# Patient Record
Sex: FEMALE | NOT HISPANIC OR LATINO | Employment: FULL TIME
[De-identification: names, ages, dates, MRNs, and addresses within clinical notes are randomized per-mention and may not be internally consistent; named-entity substitution may affect disease eponyms.]

---

## 2017-10-31 ENCOUNTER — IMAGING SERVICES (OUTPATIENT)
Dept: OTHER | Age: 27
End: 2017-10-31

## 2017-10-31 ENCOUNTER — LAB SERVICES (OUTPATIENT)
Dept: OTHER | Age: 27
End: 2017-10-31

## 2017-10-31 ENCOUNTER — HOSPITAL (OUTPATIENT)
Dept: OTHER | Age: 27
End: 2017-10-31
Attending: EMERGENCY MEDICINE

## 2017-10-31 LAB
ABO + RH BLD: NORMAL
ANALYZER ANC (IANC): ABNORMAL
ANALYZER ANC (IANC): ABNORMAL
APPEARANCE UR: CLEAR
APPEARANCE UR: CLEAR
BACTERIA #/AREA URNS HPF: ABNORMAL /HPF
BACTERIA #/AREA URNS HPF: ABNORMAL /HPF
BASOPHILS # BLD: 0 K/MCL (ref 0–0.3)
BASOPHILS # BLD: 0 THOUSAND/MCL (ref 0–0.3)
BASOPHILS NFR BLD: 0 %
BASOPHILS NFR BLD: 0 %
BILIRUB UR QL: NEGATIVE
BILIRUB UR QL: NEGATIVE
C TRACH RRNA SPEC QL NAA+PROBE: NEGATIVE
CLUE CELLS SPEC QL WET PREP: NORMAL
CLUE CELLS SPEC QL WET PREP: NORMAL
COLOR UR: YELLOW
COLOR UR: YELLOW
DIFFERENTIAL METHOD BLD: ABNORMAL
DIFFERENTIAL METHOD BLD: ABNORMAL
EOSINOPHIL # BLD: 0.6 K/MCL (ref 0.1–0.5)
EOSINOPHIL # BLD: 0.6 THOUSAND/MCL (ref 0.1–0.5)
EOSINOPHIL NFR BLD: 5 %
EOSINOPHIL NFR BLD: 5 %
ERYTHROCYTE [DISTWIDTH] IN BLOOD: 12.3 % (ref 11–15)
ERYTHROCYTE [DISTWIDTH] IN BLOOD: 12.3 % (ref 11–15)
GLUCOSE UR-MCNC: NEGATIVE MG/DL
GLUCOSE UR-MCNC: NEGATIVE MG/DL
HCG POINT OF CARE (5HGRST): POSITIVE
HCG POINT OF CARE (5HGRST): POSITIVE
HCG SERPL-ACNC: ABNORMAL MUNIT/ML
HCG SERPL-ACNC: ABNORMAL MUNITS/ML
HEMATOCRIT: 40.1 % (ref 36–46.5)
HEMATOCRIT: 40.1 % (ref 36–46.5)
HEMOGLOBIN: 13.8 G/DL (ref 12–15.5)
HGB BLD-MCNC: 13.8 GM/DL (ref 12–15.5)
HGB UR QL: ABNORMAL
HYALINE CASTS #/AREA URNS LPF: ABNORMAL /LPF (ref 0–5)
HYALINE CASTS #/AREA URNS LPF: ABNORMAL /LPF (ref 0–5)
KETONES UR-MCNC: NEGATIVE MG/DL
KETONES UR-MCNC: NEGATIVE MG/DL
LEUKOCYTE ESTERASE UR QL STRIP: NEGATIVE
LYMPHOCYTES # BLD: 1.8 K/MCL (ref 1–4.8)
LYMPHOCYTES # BLD: 1.8 THOUSAND/MCL (ref 1–4.8)
LYMPHOCYTES NFR BLD: 15 %
LYMPHOCYTES NFR BLD: 15 %
MCH RBC QN AUTO: 30.7 PG (ref 26–34)
MCHC RBC AUTO-ENTMCNC: 34.4 GM/DL (ref 32–36.5)
MCV RBC AUTO: 89.3 FL (ref 78–100)
MEAN CORPUSCULAR HEMOGLOBIN: 30.7 PG (ref 26–34)
MEAN CORPUSCULAR HGB CONC: 34.4 G/DL (ref 32–36.5)
MEAN CORPUSCULAR VOLUME: 89.3 FL (ref 78–100)
MICROSCOPIC (MT): ABNORMAL
MICROSCOPIC (MT): ABNORMAL
MONOCYTES # BLD: 1.1 K/MCL (ref 0.3–0.9)
MONOCYTES # BLD: 1.1 THOUSAND/MCL (ref 0.3–0.9)
MONOCYTES NFR BLD: 9 %
MONOCYTES NFR BLD: 9 %
N GONORRHOEA RRNA SPEC QL NAA+PROBE: NEGATIVE
NEUTROPHILS # BLD: 8.6 K/MCL (ref 1.8–7.7)
NEUTROPHILS # BLD: 8.6 THOUSAND/MCL (ref 1.8–7.7)
NEUTROPHILS NFR BLD: 71 %
NEUTROPHILS NFR BLD: 71 %
NEUTS SEG NFR BLD: ABNORMAL
NEUTS SEG NFR BLD: ABNORMAL %
NITRITE UR QL: NEGATIVE
NITRITE UR QL: NEGATIVE
NRBC (NRBCRE): ABNORMAL
PERCENT NRBC: ABNORMAL
PH UR: 6 UNIT (ref 5–7)
PH UR: 6 UNITS (ref 5–7)
PLATELET # BLD: 330 THOUSAND/MCL (ref 140–450)
PLATELET COUNT: 330 K/MCL (ref 140–450)
PROT UR QL: NEGATIVE MG/DL
PROT UR QL: NEGATIVE MG/DL
RBC # BLD: 4.49 MILLION/MCL (ref 4–5.2)
RBC #/AREA URNS HPF: ABNORMAL /HPF (ref 0–3)
RBC #/AREA URNS HPF: ABNORMAL /HPF (ref 0–3)
RBC-URINE: ABNORMAL
RED CELL COUNT: 4.49 MIL/MCL (ref 4–5.2)
SP GR UR: 1.01 (ref 1–1.03)
SP GR UR: 1.01 (ref 1–1.03)
SPECIMEN SOURCE: ABNORMAL
SPECIMEN SOURCE: ABNORMAL
SPECIMEN SOURCE: NORMAL
SQUAMOUS #/AREA URNS HPF: ABNORMAL /HPF (ref 0–5)
SQUAMOUS #/AREA URNS HPF: ABNORMAL /HPF (ref 0–5)
T VAGINALIS SPEC QL WET PREP: NORMAL
T VAGINALIS SPEC QL WET PREP: NORMAL
URNS CMNT MICRO: ABNORMAL
URNS CMNT MICRO: ABNORMAL
UROBILINOGEN UR QL: 0.2 MG/DL (ref 0–1)
UROBILINOGEN UR QL: 0.2 MG/DL (ref 0–1)
WBC # BLD: 12.1 THOUSAND/MCL (ref 4.2–11)
WBC #/AREA URNS HPF: ABNORMAL /HPF (ref 0–5)
WBC #/AREA URNS HPF: ABNORMAL /HPF (ref 0–5)
WBC-URINE: NEGATIVE
WHITE BLOOD COUNT: 12.1 K/MCL (ref 4.2–11)
YEAST SPEC QL WET PREP: NORMAL
YEAST SPEC QL WET PREP: NORMAL

## 2018-02-08 ENCOUNTER — HOSPITAL (OUTPATIENT)
Dept: OTHER | Age: 28
End: 2018-02-08

## 2021-01-06 LAB
1HR O'SULLIVAN: 270
ABO + RH BLD: NORMAL
C TRACH RRNA SPEC QL NAA+PROBE: NEGATIVE
HIV 1+2 AB+HIV1 P24 AG SERPL QL IA: NORMAL
N GONORRHOEA RRNA SPEC QL NAA+PROBE: NEGATIVE
RPR SER QL: NORMAL
RUBV IGG SERPL IA-ACNC: NORMAL

## 2021-01-27 ENCOUNTER — TELEPHONE (OUTPATIENT)
Dept: MATERNAL FETAL MEDICINE | Age: 31
End: 2021-01-27

## 2021-01-27 DIAGNOSIS — O24.419 GDM (GESTATIONAL DIABETES MELLITUS): Primary | ICD-10-CM

## 2021-01-28 PROBLEM — O99.210 OBESITY COMPLICATING PREGNANCY: Status: ACTIVE | Noted: 2021-01-28

## 2021-01-28 PROBLEM — O24.419 GESTATIONAL DIABETES MELLITUS: Status: ACTIVE | Noted: 2021-01-28

## 2021-01-28 PROBLEM — Z3A.12 12 WEEKS GESTATION OF PREGNANCY: Status: ACTIVE | Noted: 2021-01-28

## 2021-02-02 ENCOUNTER — OFFICE VISIT (OUTPATIENT)
Dept: MATERNAL FETAL MEDICINE | Age: 31
End: 2021-02-02

## 2021-02-02 DIAGNOSIS — O99.211 OBESITY AFFECTING PREGNANCY IN FIRST TRIMESTER: Primary | ICD-10-CM

## 2021-02-02 DIAGNOSIS — Z3A.12 12 WEEKS GESTATION OF PREGNANCY: ICD-10-CM

## 2021-02-02 DIAGNOSIS — O24.419 GESTATIONAL DIABETES MELLITUS (GDM) IN FIRST TRIMESTER, GESTATIONAL DIABETES METHOD OF CONTROL UNSPECIFIED: ICD-10-CM

## 2021-02-02 DIAGNOSIS — Z87.59 HISTORY OF PRIOR PREGNANCY WITH IUGR NEWBORN: ICD-10-CM

## 2021-02-02 PROCEDURE — 99243 OFF/OP CNSLTJ NEW/EST LOW 30: CPT | Performed by: CLINICAL NURSE SPECIALIST

## 2021-02-02 RX ORDER — LANCETS
EACH MISCELLANEOUS
Qty: 102 EACH | Refills: 5 | Status: SHIPPED | OUTPATIENT
Start: 2021-02-02 | End: 2021-06-03 | Stop reason: SDUPTHER

## 2021-02-02 RX ORDER — VITAMIN A ACETATE, BETA CAROTENE, ASCORBIC ACID, CHOLECALCIFEROL, .ALPHA.-TOCOPHEROL ACETATE, DL-, THIAMINE MONONITRATE, RIBOFLAVIN, NIACINAMIDE, PYRIDOXINE HYDROCHLORIDE, FOLIC ACID, CYANOCOBALAMIN, CALCIUM CARBONATE, FERROUS FUMARATE, ZINC OXIDE, CUPRIC OXIDE 3080; 12; 120; 400; 1; 1.84; 3; 20; 22; 920; 25; 200; 27; 10; 2 [IU]/1; UG/1; MG/1; [IU]/1; MG/1; MG/1; MG/1; MG/1; MG/1; [IU]/1; MG/1; MG/1; MG/1; MG/1; MG/1
1 TABLET, FILM COATED ORAL DAILY
COMMUNITY

## 2021-02-02 RX ORDER — ASPIRIN 81 MG/1
81 TABLET ORAL DAILY
Qty: 30 TABLET | Refills: 5 | Status: ON HOLD | COMMUNITY
Start: 2021-02-02 | End: 2021-08-02 | Stop reason: HOSPADM

## 2021-02-02 ASSESSMENT — ENCOUNTER SYMPTOMS
LIGHT-HEADEDNESS: 0
CONFUSION: 0
DIZZINESS: 0
TROUBLE SWALLOWING: 0
COUGH: 0
DIARRHEA: 0
NAUSEA: 1
VOMITING: 1
PHOTOPHOBIA: 0
FEVER: 0
ABDOMINAL PAIN: 0
CONSTIPATION: 0
UNEXPECTED WEIGHT CHANGE: 0
FATIGUE: 0
CHILLS: 0
POLYDIPSIA: 0
CHEST TIGHTNESS: 0
SORE THROAT: 0
APPETITE CHANGE: 0
SHORTNESS OF BREATH: 0
POLYPHAGIA: 0
HEADACHES: 0
NERVOUS/ANXIOUS: 0
CHOKING: 0

## 2021-02-03 LAB — HBA1C MFR BLD: 6.7 %

## 2021-02-08 PROBLEM — Z3A.13 13 WEEKS GESTATION OF PREGNANCY: Status: ACTIVE | Noted: 2021-01-28

## 2021-02-10 ENCOUNTER — TELEPHONIC VISIT (OUTPATIENT)
Dept: MATERNAL FETAL MEDICINE | Age: 31
End: 2021-02-10

## 2021-02-10 DIAGNOSIS — Z36.9 ENCOUNTER FOR ANTENATAL SCREENING OF MOTHER: Primary | ICD-10-CM

## 2021-02-10 PROCEDURE — 99203 OFFICE O/P NEW LOW 30 MIN: CPT | Performed by: OBSTETRICS & GYNECOLOGY

## 2021-02-11 ENCOUNTER — TELEPHONE (OUTPATIENT)
Dept: NUTRITION | Age: 31
End: 2021-02-11

## 2021-02-12 ENCOUNTER — V-VISIT (OUTPATIENT)
Dept: NUTRITION | Age: 31
End: 2021-02-12

## 2021-02-12 DIAGNOSIS — O24.419 GESTATIONAL DIABETES MELLITUS (GDM) IN FIRST TRIMESTER, GESTATIONAL DIABETES METHOD OF CONTROL UNSPECIFIED: Primary | ICD-10-CM

## 2021-02-12 PROCEDURE — 97802 MEDICAL NUTRITION INDIV IN: CPT | Performed by: DIETITIAN, REGISTERED

## 2021-03-08 ENCOUNTER — DOCUMENTATION (OUTPATIENT)
Dept: MATERNAL FETAL MEDICINE | Age: 31
End: 2021-03-08

## 2021-03-16 ENCOUNTER — TELEPHONE (OUTPATIENT)
Dept: MATERNAL FETAL MEDICINE | Age: 31
End: 2021-03-16

## 2021-03-16 DIAGNOSIS — Z34.90 PREGNANCY: Primary | ICD-10-CM

## 2021-03-16 DIAGNOSIS — O24.419 GDM (GESTATIONAL DIABETES MELLITUS): ICD-10-CM

## 2021-03-18 DIAGNOSIS — O24.419 GESTATIONAL DIABETES MELLITUS (GDM) IN FIRST TRIMESTER, GESTATIONAL DIABETES METHOD OF CONTROL UNSPECIFIED: ICD-10-CM

## 2021-04-14 ENCOUNTER — OFFICE VISIT (OUTPATIENT)
Dept: MATERNAL FETAL MEDICINE | Age: 31
End: 2021-04-14

## 2021-04-14 DIAGNOSIS — O99.211 OBESITY AFFECTING PREGNANCY IN FIRST TRIMESTER: ICD-10-CM

## 2021-04-14 DIAGNOSIS — Z87.59 HISTORY OF PRIOR PREGNANCY WITH IUGR NEWBORN: ICD-10-CM

## 2021-04-14 DIAGNOSIS — Z36.89 ENCOUNTER FOR FETAL ANATOMIC SURVEY: Primary | ICD-10-CM

## 2021-04-14 DIAGNOSIS — O24.419 GESTATIONAL DIABETES MELLITUS (GDM) IN SECOND TRIMESTER, GESTATIONAL DIABETES METHOD OF CONTROL UNSPECIFIED: ICD-10-CM

## 2021-04-14 PROCEDURE — 76817 TRANSVAGINAL US OBSTETRIC: CPT | Performed by: OBSTETRICS & GYNECOLOGY

## 2021-04-14 PROCEDURE — 76811 OB US DETAILED SNGL FETUS: CPT | Performed by: OBSTETRICS & GYNECOLOGY

## 2021-05-13 DIAGNOSIS — Z36.89 ENCOUNTER FOR ULTRASOUND TO ASSESS FETAL GROWTH: Primary | ICD-10-CM

## 2021-05-14 ENCOUNTER — OFFICE VISIT (OUTPATIENT)
Dept: MATERNAL FETAL MEDICINE | Age: 31
End: 2021-05-14

## 2021-05-14 DIAGNOSIS — O99.212 OBESITY AFFECTING PREGNANCY IN SECOND TRIMESTER: ICD-10-CM

## 2021-05-14 DIAGNOSIS — Z87.59 HISTORY OF PRIOR PREGNANCY WITH IUGR NEWBORN: ICD-10-CM

## 2021-05-14 DIAGNOSIS — Z3A.27 27 WEEKS GESTATION OF PREGNANCY: ICD-10-CM

## 2021-05-14 DIAGNOSIS — O24.419 GESTATIONAL DIABETES MELLITUS (GDM) IN SECOND TRIMESTER, GESTATIONAL DIABETES METHOD OF CONTROL UNSPECIFIED: Primary | ICD-10-CM

## 2021-05-14 DIAGNOSIS — Z36.89 ENCOUNTER FOR ULTRASOUND TO ASSESS FETAL GROWTH: ICD-10-CM

## 2021-05-14 PROCEDURE — 76816 OB US FOLLOW-UP PER FETUS: CPT | Performed by: OBSTETRICS & GYNECOLOGY

## 2021-05-17 ENCOUNTER — DOCUMENTATION (OUTPATIENT)
Dept: MATERNAL FETAL MEDICINE | Age: 31
End: 2021-05-17

## 2021-05-17 DIAGNOSIS — Z36.89 ENCOUNTER FOR ULTRASOUND TO ASSESS FETAL GROWTH: ICD-10-CM

## 2021-05-26 PROBLEM — F32.A DEPRESSION DURING PREGNANCY IN THIRD TRIMESTER: Status: ACTIVE | Noted: 2021-05-26

## 2021-05-26 PROBLEM — O99.343 DEPRESSION DURING PREGNANCY IN THIRD TRIMESTER (HCC): Status: ACTIVE | Noted: 2021-05-26

## 2021-05-26 PROBLEM — O99.343 DEPRESSION DURING PREGNANCY IN THIRD TRIMESTER: Status: ACTIVE | Noted: 2021-05-26

## 2021-05-26 PROBLEM — F41.9 ANXIETY: Status: ACTIVE | Noted: 2021-05-26

## 2021-05-26 PROBLEM — F32.A DEPRESSION DURING PREGNANCY IN THIRD TRIMESTER (HCC): Status: ACTIVE | Noted: 2021-05-26

## 2021-05-26 NOTE — PROGRESS NOTES
Moises Hernandez is a 31year old female. Patient presents with:  Routine Physical  Rash Skin Problem: discoloration in mouth      HPI:   Patient is a 22-year-old female who presents today as a new patient.   Patient is pregnant and due in August.  Patient exertion, palpitations, swelling in feet  GI: denies abdominal pain and denies heartburn, nausea or vomiting  : No Pain on urination, change in the color of urine, discharge, urinating frequently  MUS: No back pain, joint pain, muscle pain  NEURO: denies

## 2021-05-27 ENCOUNTER — DOCUMENTATION (OUTPATIENT)
Dept: MATERNAL FETAL MEDICINE | Age: 31
End: 2021-05-27

## 2021-05-28 ENCOUNTER — OFFICE VISIT (OUTPATIENT)
Dept: PEDIATRIC CARDIOLOGY | Age: 31
End: 2021-05-28
Attending: PEDIATRICS

## 2021-05-28 ENCOUNTER — HOSPITAL ENCOUNTER (OUTPATIENT)
Dept: PEDIATRIC CARDIOLOGY | Age: 31
Discharge: HOME | End: 2021-05-28
Attending: PEDIATRICS

## 2021-05-28 DIAGNOSIS — O24.913 DIABETES MELLITUS DURING PREGNANCY IN THIRD TRIMESTER, UNSPECIFIED DIABETES MELLITUS TYPE: Primary | ICD-10-CM

## 2021-05-28 DIAGNOSIS — O24.913 DIABETES MELLITUS DURING PREGNANCY IN THIRD TRIMESTER, UNSPECIFIED DIABETES MELLITUS TYPE: ICD-10-CM

## 2021-05-28 DIAGNOSIS — O24.419 GESTATIONAL DIABETES MELLITUS (GDM) IN SECOND TRIMESTER, GESTATIONAL DIABETES METHOD OF CONTROL UNSPECIFIED: ICD-10-CM

## 2021-05-28 PROCEDURE — 76827 ECHO EXAM OF FETAL HEART: CPT | Performed by: PEDIATRICS

## 2021-05-28 PROCEDURE — 76825 ECHO EXAM OF FETAL HEART: CPT | Performed by: PEDIATRICS

## 2021-05-28 PROCEDURE — 93325 DOPPLER ECHO COLOR FLOW MAPG: CPT

## 2021-05-28 PROCEDURE — 93325 DOPPLER ECHO COLOR FLOW MAPG: CPT | Performed by: PEDIATRICS

## 2021-05-28 PROCEDURE — 99241 OFFICE CONSULTATION,LEVEL I: CPT | Performed by: PEDIATRICS

## 2021-06-01 ENCOUNTER — TELEPHONE (OUTPATIENT)
Dept: MATERNAL FETAL MEDICINE | Age: 31
End: 2021-06-01

## 2021-06-02 ENCOUNTER — OFFICE VISIT (OUTPATIENT)
Dept: MATERNAL FETAL MEDICINE | Age: 31
End: 2021-06-02

## 2021-06-02 DIAGNOSIS — O99.343 DEPRESSION DURING PREGNANCY IN THIRD TRIMESTER: ICD-10-CM

## 2021-06-02 DIAGNOSIS — O24.414 INSULIN CONTROLLED GESTATIONAL DIABETES MELLITUS (GDM) IN THIRD TRIMESTER: Primary | ICD-10-CM

## 2021-06-02 DIAGNOSIS — Z87.59 HISTORY OF PRIOR PREGNANCY WITH IUGR NEWBORN: ICD-10-CM

## 2021-06-02 DIAGNOSIS — O24.419 GESTATIONAL DIABETES MELLITUS (GDM) IN FIRST TRIMESTER, GESTATIONAL DIABETES METHOD OF CONTROL UNSPECIFIED: ICD-10-CM

## 2021-06-02 DIAGNOSIS — Z3A.29 29 WEEKS GESTATION OF PREGNANCY: ICD-10-CM

## 2021-06-02 DIAGNOSIS — F32.A DEPRESSION DURING PREGNANCY IN THIRD TRIMESTER: ICD-10-CM

## 2021-06-02 DIAGNOSIS — F41.9 ANXIETY: ICD-10-CM

## 2021-06-02 DIAGNOSIS — O99.213 OBESITY AFFECTING PREGNANCY IN THIRD TRIMESTER: ICD-10-CM

## 2021-06-02 PROCEDURE — 99242 OFF/OP CONSLTJ NEW/EST SF 20: CPT | Performed by: CLINICAL NURSE SPECIALIST

## 2021-06-02 RX ORDER — PEN NEEDLE, DIABETIC 30 GX3/16"
1 NEEDLE, DISPOSABLE MISCELLANEOUS DAILY
Qty: 100 EACH | Refills: 2 | Status: SHIPPED | OUTPATIENT
Start: 2021-06-02 | End: 2021-06-03 | Stop reason: SDUPTHER

## 2021-06-02 RX ORDER — INSULIN DETEMIR 100 [IU]/ML
30 INJECTION, SOLUTION SUBCUTANEOUS NIGHTLY
Qty: 15 ML | Refills: 2 | Status: SHIPPED | OUTPATIENT
Start: 2021-06-02 | End: 2021-06-03 | Stop reason: SDUPTHER

## 2021-06-03 RX ORDER — INSULIN DETEMIR 100 [IU]/ML
30 INJECTION, SOLUTION SUBCUTANEOUS NIGHTLY
Qty: 15 ML | Refills: 2 | Status: SHIPPED | OUTPATIENT
Start: 2021-06-03 | End: 2021-06-21 | Stop reason: SDUPTHER

## 2021-06-03 RX ORDER — LANCETS
EACH MISCELLANEOUS
Qty: 102 EACH | Refills: 5 | Status: ON HOLD | OUTPATIENT
Start: 2021-06-03 | End: 2021-08-02 | Stop reason: HOSPADM

## 2021-06-03 RX ORDER — PEN NEEDLE, DIABETIC 30 GX3/16"
1 NEEDLE, DISPOSABLE MISCELLANEOUS DAILY
Qty: 100 EACH | Refills: 2 | Status: SHIPPED | OUTPATIENT
Start: 2021-06-03 | End: 2021-06-21 | Stop reason: SDUPTHER

## 2021-06-09 LAB
HIV 1+2 AB+HIV1 P24 AG SERPL QL IA: NORMAL
RPR SER QL: NORMAL

## 2021-06-21 DIAGNOSIS — O24.414 INSULIN CONTROLLED GESTATIONAL DIABETES MELLITUS (GDM) IN THIRD TRIMESTER: Primary | ICD-10-CM

## 2021-06-21 DIAGNOSIS — O24.414 INSULIN CONTROLLED GESTATIONAL DIABETES MELLITUS (GDM) IN THIRD TRIMESTER: ICD-10-CM

## 2021-06-21 RX ORDER — INSULIN DETEMIR 100 [IU]/ML
32 INJECTION, SOLUTION SUBCUTANEOUS NIGHTLY
Qty: 15 ML | Refills: 2 | Status: SHIPPED | OUTPATIENT
Start: 2021-06-21 | End: 2021-07-12 | Stop reason: SDUPTHER

## 2021-06-21 RX ORDER — PEN NEEDLE, DIABETIC 30 GX3/16"
1 NEEDLE, DISPOSABLE MISCELLANEOUS 2 TIMES DAILY
Qty: 100 EACH | Refills: 2 | Status: ON HOLD | OUTPATIENT
Start: 2021-06-21 | End: 2021-08-02 | Stop reason: HOSPADM

## 2021-07-12 DIAGNOSIS — O24.414 INSULIN CONTROLLED GESTATIONAL DIABETES MELLITUS (GDM) IN THIRD TRIMESTER: ICD-10-CM

## 2021-07-12 PROCEDURE — 99457 RPM TX MGMT 1ST 20 MIN: CPT | Performed by: CLINICAL NURSE SPECIALIST

## 2021-07-12 RX ORDER — INSULIN DETEMIR 100 [IU]/ML
34 INJECTION, SOLUTION SUBCUTANEOUS NIGHTLY
Qty: 15 ML | Refills: 2 | Status: ON HOLD | OUTPATIENT
Start: 2021-07-12 | End: 2021-08-02 | Stop reason: HOSPADM

## 2021-07-16 LAB — GBS EXTERNAL: NEGATIVE

## 2021-07-17 ENCOUNTER — HOSPITAL ENCOUNTER (OUTPATIENT)
Age: 31
Discharge: HOME | End: 2021-07-17
Attending: OBSTETRICS & GYNECOLOGY | Admitting: OBSTETRICS & GYNECOLOGY

## 2021-07-17 VITALS
BODY MASS INDEX: 31.34 KG/M2 | HEIGHT: 66 IN | HEART RATE: 83 BPM | TEMPERATURE: 98.6 F | RESPIRATION RATE: 18 BRPM | DIASTOLIC BLOOD PRESSURE: 65 MMHG | WEIGHT: 195 LBS | SYSTOLIC BLOOD PRESSURE: 119 MMHG

## 2021-07-17 DIAGNOSIS — Z3A.36 36 WEEKS GESTATION OF PREGNANCY: ICD-10-CM

## 2021-07-17 DIAGNOSIS — O24.419 GESTATIONAL DIABETES MELLITUS (GDM) IN THIRD TRIMESTER, GESTATIONAL DIABETES METHOD OF CONTROL UNSPECIFIED: ICD-10-CM

## 2021-07-17 LAB — GLUCOSE BLDC GLUCOMTR-MCNC: 95 MG/DL (ref 70–99)

## 2021-07-17 PROCEDURE — 99212 OFFICE O/P EST SF 10 MIN: CPT

## 2021-07-17 PROCEDURE — 99212 OFFICE O/P EST SF 10 MIN: CPT | Performed by: OBSTETRICS & GYNECOLOGY

## 2021-07-17 PROCEDURE — 82962 GLUCOSE BLOOD TEST: CPT

## 2021-07-17 PROCEDURE — 76815 OB US LIMITED FETUS(S): CPT

## 2021-07-17 PROCEDURE — 76818 FETAL BIOPHYS PROFILE W/NST: CPT | Performed by: OBSTETRICS & GYNECOLOGY

## 2021-07-19 DIAGNOSIS — O24.414 INSULIN CONTROLLED GESTATIONAL DIABETES MELLITUS (GDM) IN THIRD TRIMESTER: ICD-10-CM

## 2021-07-19 PROCEDURE — 99457 RPM TX MGMT 1ST 20 MIN: CPT | Performed by: CLINICAL NURSE SPECIALIST

## 2021-07-20 DIAGNOSIS — O24.419 GESTATIONAL DIABETES MELLITUS (GDM) IN FIRST TRIMESTER, GESTATIONAL DIABETES METHOD OF CONTROL UNSPECIFIED: ICD-10-CM

## 2021-08-01 ENCOUNTER — ANESTHESIA EVENT (OUTPATIENT)
Dept: OBGYN | Age: 31
End: 2021-08-01

## 2021-08-01 ENCOUNTER — HOSPITAL ENCOUNTER (INPATIENT)
Age: 31
LOS: 2 days | Discharge: HOME | End: 2021-08-03
Attending: OBSTETRICS & GYNECOLOGY | Admitting: OBSTETRICS & GYNECOLOGY

## 2021-08-01 ENCOUNTER — ANESTHESIA (OUTPATIENT)
Dept: OBGYN | Age: 31
End: 2021-08-01

## 2021-08-01 LAB
ABO + RH BLD: NORMAL
BASE EXCESS / DEFICIT, ARTERIAL CORD BLOOD - RESPIRATORY: -5 MMOL/L
BASE EXCESS / DEFICIT, VENOUS CORD BLOOD - RESPIRATORY: -5 MMOL/L
BASOPHILS # BLD: 0 K/MCL (ref 0–0.3)
BASOPHILS NFR BLD: 0 %
BLD GP AB SCN SERPL QL GEL: NEGATIVE
DEPRECATED RDW RBC: 41.5 FL (ref 39–50)
EOSINOPHIL # BLD: 0.4 K/MCL (ref 0–0.5)
EOSINOPHIL NFR BLD: 4 %
ERYTHROCYTE [DISTWIDTH] IN BLOOD: 13.3 % (ref 11–15)
GLUCOSE BLDC GLUCOMTR-MCNC: 152 MG/DL (ref 70–99)
GLUCOSE BLDC GLUCOMTR-MCNC: 74 MG/DL (ref 70–99)
HCO3 BLDCOA-SCNC: 22 MMOL/L (ref 21–28)
HCO3 BLDCOV-SCNC: 20 MMOL/L (ref 22–29)
HCT VFR BLD CALC: 38.3 % (ref 36–46.5)
HGB BLD-MCNC: 12.7 G/DL (ref 12–15.5)
IMM GRANULOCYTES # BLD AUTO: 0 K/MCL (ref 0–0.2)
IMM GRANULOCYTES # BLD: 0 %
LYMPHOCYTES # BLD: 1 K/MCL (ref 1–4.8)
LYMPHOCYTES NFR BLD: 10 %
MCH RBC QN AUTO: 28.2 PG (ref 26–34)
MCHC RBC AUTO-ENTMCNC: 33.2 G/DL (ref 32–36.5)
MCV RBC AUTO: 85.1 FL (ref 78–100)
MONOCYTES # BLD: 0.7 K/MCL (ref 0.3–0.9)
MONOCYTES NFR BLD: 7 %
NEUTROPHILS # BLD: 7.8 K/MCL (ref 1.8–7.7)
NEUTROPHILS NFR BLD: 79 %
NRBC BLD MANUAL-RTO: 0 /100 WBC
PCO2 BLDCOA: 49 MM HG (ref 31–74)
PCO2 BLDCOV: 37 MM HG (ref 23–49)
PH BLDCOA: 7.26 UNITS (ref 7.18–7.38)
PH BLDCOV: 7.34 UNITS (ref 7.25–7.45)
PLATELET # BLD AUTO: 289 K/MCL (ref 140–450)
PO2 BLDCOA: 23 MM HG (ref 6–31)
PO2 BLDCOV: 37 MM HG (ref 17–41)
RBC # BLD: 4.5 MIL/MCL (ref 4–5.2)
SARS-COV-2 RNA RESP QL NAA+PROBE: NOT DETECTED
SERVICE CMNT-IMP: NORMAL
SERVICE CMNT-IMP: NORMAL
TYPE AND SCREEN EXPIRATION DATE: NORMAL
WBC # BLD: 9.9 K/MCL (ref 4.2–11)

## 2021-08-01 PROCEDURE — 85025 COMPLETE CBC W/AUTO DIFF WBC: CPT | Performed by: STUDENT IN AN ORGANIZED HEALTH CARE EDUCATION/TRAINING PROGRAM

## 2021-08-01 PROCEDURE — 86900 BLOOD TYPING SEROLOGIC ABO: CPT | Performed by: STUDENT IN AN ORGANIZED HEALTH CARE EDUCATION/TRAINING PROGRAM

## 2021-08-01 PROCEDURE — 10001788 HB DELIVERY VAGINAL

## 2021-08-01 PROCEDURE — 10002803 HB RX 637

## 2021-08-01 PROCEDURE — 82803 BLOOD GASES ANY COMBINATION: CPT

## 2021-08-01 PROCEDURE — 10002807 HB RX 258: Performed by: STUDENT IN AN ORGANIZED HEALTH CARE EDUCATION/TRAINING PROGRAM

## 2021-08-01 PROCEDURE — 76815 OB US LIMITED FETUS(S): CPT

## 2021-08-01 PROCEDURE — 10907ZC DRAINAGE OF AMNIOTIC FLUID, THERAPEUTIC FROM PRODUCTS OF CONCEPTION, VIA NATURAL OR ARTIFICIAL OPENING: ICD-10-PCS | Performed by: OBSTETRICS & GYNECOLOGY

## 2021-08-01 PROCEDURE — 13000012 HB ANESTHESIA SPINAL/EPIDURAL IN L&D

## 2021-08-01 PROCEDURE — 10002801 HB RX 250 W/O HCPCS

## 2021-08-01 PROCEDURE — 13001456 HB PERINATAL CARE

## 2021-08-01 PROCEDURE — 10000003 HB ROOM CHARGE WOMEN'S HEALTH

## 2021-08-01 PROCEDURE — 10002800 HB RX 250 W HCPCS

## 2021-08-01 PROCEDURE — 88307 TISSUE EXAM BY PATHOLOGIST: CPT | Performed by: OBSTETRICS & GYNECOLOGY

## 2021-08-01 PROCEDURE — U0005 INFEC AGEN DETEC AMPLI PROBE: HCPCS | Performed by: STUDENT IN AN ORGANIZED HEALTH CARE EDUCATION/TRAINING PROGRAM

## 2021-08-01 PROCEDURE — 10004651 HB RX, NO CHARGE ITEM

## 2021-08-01 PROCEDURE — 13000001 HB PHASE II RECOVERY EA 30 MINUTES

## 2021-08-01 RX ORDER — VITAMIN A, VITAMIN C, VITAMIN D-3, VITAMIN E, VITAMIN B-1, VITAMIN B-2, NIACIN, VITAMIN B-6, CALCIUM, IRON, ZINC, COPPER 4000; 120; 400; 22; 1.84; 3; 20; 10; 1; 12; 200; 27; 25; 2 [IU]/1; MG/1; [IU]/1; MG/1; MG/1; MG/1; MG/1; MG/1; MG/1; UG/1; MG/1; MG/1; MG/1; MG/1
1 TABLET ORAL DAILY
Status: DISCONTINUED | OUTPATIENT
Start: 2021-08-01 | End: 2021-08-03 | Stop reason: HOSPADM

## 2021-08-01 RX ORDER — CALCIUM CARBONATE 500 MG/1
500 TABLET, CHEWABLE ORAL EVERY 4 HOURS PRN
Status: DISCONTINUED | OUTPATIENT
Start: 2021-08-01 | End: 2021-08-03 | Stop reason: HOSPADM

## 2021-08-01 RX ORDER — CALCIUM CARBONATE 500 MG/1
500 TABLET, CHEWABLE ORAL EVERY 4 HOURS PRN
Status: DISCONTINUED | OUTPATIENT
Start: 2021-08-01 | End: 2021-08-01

## 2021-08-01 RX ORDER — LIDOCAINE HYDROCHLORIDE 10 MG/ML
30 INJECTION, SOLUTION EPIDURAL; INFILTRATION; INTRACAUDAL; PERINEURAL
Status: DISCONTINUED | OUTPATIENT
Start: 2021-08-01 | End: 2021-08-01

## 2021-08-01 RX ORDER — EPHEDRINE SULFATE/0.9% NACL/PF 50 MG/10ML
10 SYRINGE (ML) INTRAVENOUS
Status: DISCONTINUED | OUTPATIENT
Start: 2021-08-01 | End: 2021-08-01

## 2021-08-01 RX ORDER — HEPARIN SOD,PORK IN 0.45% NACL 25000/500
INTRAVENOUS SOLUTION INTRAVENOUS CONTINUOUS
Status: DISCONTINUED | OUTPATIENT
Start: 2021-08-01 | End: 2021-08-01 | Stop reason: SDUPTHER

## 2021-08-01 RX ORDER — OXYTOCIN-SODIUM CHLORIDE 0.9% IV SOLN 30 UNIT/500ML 30-0.9/5 UT/ML-%
0-334 SOLUTION INTRAVENOUS CONTINUOUS
Status: DISCONTINUED | OUTPATIENT
Start: 2021-08-01 | End: 2021-08-01

## 2021-08-01 RX ORDER — OXYTOCIN/0.9 % SODIUM CHLORIDE 30/500 ML
PLASTIC BAG, INJECTION (ML) INTRAVENOUS
Status: COMPLETED
Start: 2021-08-01 | End: 2021-08-01

## 2021-08-01 RX ORDER — OXYTOCIN 10 [USP'U]/ML
10 INJECTION, SOLUTION INTRAMUSCULAR; INTRAVENOUS ONCE
Status: DISCONTINUED | OUTPATIENT
Start: 2021-08-01 | End: 2021-08-01

## 2021-08-01 RX ORDER — BUPIVACAINE HYDROCHLORIDE 2.5 MG/ML
INJECTION, SOLUTION EPIDURAL; INFILTRATION; INTRACAUDAL PRN
Status: DISCONTINUED | OUTPATIENT
Start: 2021-08-01 | End: 2021-08-01

## 2021-08-01 RX ORDER — AMOXICILLIN 250 MG
2 CAPSULE ORAL 2 TIMES DAILY PRN
Status: DISCONTINUED | OUTPATIENT
Start: 2021-08-01 | End: 2021-08-03 | Stop reason: HOSPADM

## 2021-08-01 RX ORDER — POLYETHYLENE GLYCOL 3350 17 G/17G
17 POWDER, FOR SOLUTION ORAL DAILY PRN
Status: DISCONTINUED | OUTPATIENT
Start: 2021-08-01 | End: 2021-08-03 | Stop reason: HOSPADM

## 2021-08-01 RX ORDER — ONDANSETRON 2 MG/ML
4 INJECTION INTRAMUSCULAR; INTRAVENOUS 2 TIMES DAILY PRN
Status: DISCONTINUED | OUTPATIENT
Start: 2021-08-01 | End: 2021-08-01

## 2021-08-01 RX ORDER — OXYTOCIN-SODIUM CHLORIDE 0.9% IV SOLN 30 UNIT/500ML 30-0.9/5 UT/ML-%
0-334 SOLUTION INTRAVENOUS CONTINUOUS
Status: DISCONTINUED | OUTPATIENT
Start: 2021-08-01 | End: 2021-08-03 | Stop reason: HOSPADM

## 2021-08-01 RX ORDER — BISACODYL 10 MG
10 SUPPOSITORY, RECTAL RECTAL DAILY PRN
Status: DISCONTINUED | OUTPATIENT
Start: 2021-08-01 | End: 2021-08-03 | Stop reason: HOSPADM

## 2021-08-01 RX ORDER — HEPARIN SOD,PORK IN 0.45% NACL 25000/500
INTRAVENOUS SOLUTION INTRAVENOUS
Status: COMPLETED
Start: 2021-08-01 | End: 2021-08-01

## 2021-08-01 RX ORDER — 0.9 % SODIUM CHLORIDE 0.9 %
2 VIAL (ML) INJECTION EVERY 12 HOURS SCHEDULED
Status: DISCONTINUED | OUTPATIENT
Start: 2021-08-01 | End: 2021-08-01

## 2021-08-01 RX ORDER — ACETAMINOPHEN 325 MG/1
650 TABLET ORAL EVERY 6 HOURS
Status: DISCONTINUED | OUTPATIENT
Start: 2021-08-01 | End: 2021-08-03 | Stop reason: HOSPADM

## 2021-08-01 RX ORDER — HEPARIN SOD,PORK IN 0.45% NACL 25000/500
INTRAVENOUS SOLUTION INTRAVENOUS CONTINUOUS
Status: DISCONTINUED | OUTPATIENT
Start: 2021-08-01 | End: 2021-08-01

## 2021-08-01 RX ORDER — HYDROCORTISONE ACETATE 25 MG/1
25 SUPPOSITORY RECTAL EVERY 8 HOURS PRN
Status: DISCONTINUED | OUTPATIENT
Start: 2021-08-01 | End: 2021-08-03 | Stop reason: HOSPADM

## 2021-08-01 RX ORDER — EPHEDRINE SULFATE/0.9% NACL/PF 50 MG/10ML
5 SYRINGE (ML) INTRAVENOUS
Status: DISCONTINUED | OUTPATIENT
Start: 2021-08-01 | End: 2021-08-01

## 2021-08-01 RX ORDER — IBUPROFEN 600 MG/1
600 TABLET ORAL EVERY 6 HOURS
Status: DISCONTINUED | OUTPATIENT
Start: 2021-08-01 | End: 2021-08-03 | Stop reason: HOSPADM

## 2021-08-01 RX ORDER — SODIUM CHLORIDE, SODIUM LACTATE, POTASSIUM CHLORIDE, CALCIUM CHLORIDE 600; 310; 30; 20 MG/100ML; MG/100ML; MG/100ML; MG/100ML
INJECTION, SOLUTION INTRAVENOUS CONTINUOUS
Status: DISCONTINUED | OUTPATIENT
Start: 2021-08-01 | End: 2021-08-01

## 2021-08-01 RX ORDER — SIMETHICONE 125 MG
125 TABLET,CHEWABLE ORAL EVERY 4 HOURS PRN
Status: DISCONTINUED | OUTPATIENT
Start: 2021-08-01 | End: 2021-08-03 | Stop reason: HOSPADM

## 2021-08-01 RX ORDER — HYDROCORTISONE ACETATE PRAMOXINE HCL 2.5; 1 G/100G; G/100G
1 CREAM TOPICAL 3 TIMES DAILY PRN
Status: DISCONTINUED | OUTPATIENT
Start: 2021-08-01 | End: 2021-08-03 | Stop reason: HOSPADM

## 2021-08-01 RX ADMIN — SODIUM CHLORIDE, SODIUM LACTATE, POTASSIUM CHLORIDE, AND CALCIUM CHLORIDE: .6; .31; .03; .02 INJECTION, SOLUTION INTRAVENOUS at 11:37

## 2021-08-01 RX ADMIN — BUPIVACAINE HYDROCHLORIDE 5 ML: 2.5 INJECTION, SOLUTION EPIDURAL; INFILTRATION; INTRACAUDAL at 12:08

## 2021-08-01 RX ADMIN — SODIUM CHLORIDE, SODIUM LACTATE, POTASSIUM CHLORIDE, AND CALCIUM CHLORIDE: .6; .31; .03; .02 INJECTION, SOLUTION INTRAVENOUS at 10:05

## 2021-08-01 RX ADMIN — OXYTOCIN-SODIUM CHLORIDE 0.9% IV SOLN 30 UNIT/500ML 95 ML/HR: 30-0.9/5 SOLUTION at 16:33

## 2021-08-01 RX ADMIN — BUPIVACAINE HYDROCHLORIDE 5 ML: 2.5 INJECTION, SOLUTION EPIDURAL; INFILTRATION; INTRACAUDAL at 12:12

## 2021-08-01 RX ADMIN — ACETAMINOPHEN 650 MG: 325 TABLET, FILM COATED ORAL at 20:50

## 2021-08-01 RX ADMIN — Medication 12 ML/HR: at 12:14

## 2021-08-01 RX ADMIN — IBUPROFEN 600 MG: 600 TABLET ORAL at 20:50

## 2021-08-01 ASSESSMENT — LIFESTYLE VARIABLES
AUDIT-C TOTAL SCORE: 0
ARE YOU BLIND OR DO YOU HAVE SERIOUS DIFFICULTY SEEING, EVEN WHEN WEARING GLASSES: NO
IS PATIENT ABLE TO COMPLETE ASSESSMENT AT THIS TIME: YES
HISTORY OF PROBLEMS WHEN YOU STOP DRINKING ALCOHOL: NO
ARE YOU DEAF OR DO YOU HAVE SERIOUS DIFFICULTY  HEARING: NO
ALCOHOL_USE_STATUS: NO OR LOW RISK WITH VALIDATED TOOL
SHORT OF BREATH OR FATIGUE WITH ADLS: NO
RECENTLY LOST WEIGHT WITHOUT TRYING: 0
HOW OFTEN DO YOU HAVE 6 OR MORE DRINKS ON ONE OCCASION: NEVER
CHRONIC/CANCER PAIN PRESENT: NO
ADL NEEDS ASSIST: NO
RECENT DECLINE IN ADLS: NO
ADL BEFORE ADMISSION: INDEPENDENT
HAVE YOU BEEN EATING POORLY BECAUSE OF A DECREASED APPETITE: 0
LAST DRINK YOU HAD: DENIES INTAKE
HOW OFTEN DO YOU HAVE A DRINK CONTAINING ALCOHOL: NEVER
HOW MANY STANDARD DRINKS CONTAINING ALCOHOL DO YOU HAVE ON A TYPICAL DAY: 0,1 OR 2

## 2021-08-01 ASSESSMENT — PAIN SCALES - GENERAL
PAINLEVEL_OUTOF10: 0
PAINLEVEL_OUTOF10: 0
PAINLEVEL_OUTOF10: 5

## 2021-08-01 ASSESSMENT — COGNITIVE AND FUNCTIONAL STATUS - GENERAL
BECAUSE OF A PHYSICAL, MENTAL, OR EMOTIONAL CONDITION, DO YOU HAVE DIFFICULTY DOING ERRANDS ALONE: NO
BECAUSE OF A PHYSICAL, MENTAL, OR EMOTIONAL CONDITION, DO YOU HAVE SERIOUS DIFFICULTY CONCENTRATING, REMEMBERING OR MAKING DECISIONS: NO
DO YOU HAVE SERIOUS DIFFICULTY WALKING OR CLIMBING STAIRS: NO
DO YOU HAVE DIFFICULTY DRESSING OR BATHING: NO

## 2021-08-01 ASSESSMENT — ACTIVITIES OF DAILY LIVING (ADL): ADL_SCORE: 12

## 2021-08-01 ASSESSMENT — PAIN SCALES - PAIN ASSESSMENT IN ADVANCED DEMENTIA (PAINAD): BREATHING: NORMAL

## 2021-08-02 LAB
DEPRECATED RDW RBC: 42.3 FL (ref 39–50)
ERYTHROCYTE [DISTWIDTH] IN BLOOD: 13.4 % (ref 11–15)
GLUCOSE BLDC GLUCOMTR-MCNC: 73 MG/DL (ref 70–99)
HCT VFR BLD CALC: 35.5 % (ref 36–46.5)
HGB BLD-MCNC: 11.2 G/DL (ref 12–15.5)
MCH RBC QN AUTO: 27.5 PG (ref 26–34)
MCHC RBC AUTO-ENTMCNC: 31.5 G/DL (ref 32–36.5)
MCV RBC AUTO: 87 FL (ref 78–100)
NRBC BLD MANUAL-RTO: 0 /100 WBC
PLATELET # BLD AUTO: 260 K/MCL (ref 140–450)
RBC # BLD: 4.08 MIL/MCL (ref 4–5.2)
WBC # BLD: 11.6 K/MCL (ref 4.2–11)

## 2021-08-02 PROCEDURE — 85027 COMPLETE CBC AUTOMATED: CPT

## 2021-08-02 PROCEDURE — 10000003 HB ROOM CHARGE WOMEN'S HEALTH

## 2021-08-02 PROCEDURE — 10002803 HB RX 637

## 2021-08-02 PROCEDURE — 10004651 HB RX, NO CHARGE ITEM

## 2021-08-02 RX ORDER — ACETAMINOPHEN 325 MG/1
650 TABLET ORAL EVERY 6 HOURS
Qty: 30 TABLET | Refills: 0 | Status: SHIPPED | OUTPATIENT
Start: 2021-08-02

## 2021-08-02 RX ORDER — IBUPROFEN 600 MG/1
600 TABLET ORAL EVERY 6 HOURS
Qty: 30 TABLET | Refills: 0 | Status: SHIPPED | OUTPATIENT
Start: 2021-08-02

## 2021-08-02 RX ORDER — AMOXICILLIN 250 MG
2 CAPSULE ORAL 2 TIMES DAILY PRN
Qty: 30 TABLET | Refills: 0 | Status: SHIPPED | OUTPATIENT
Start: 2021-08-02

## 2021-08-02 RX ADMIN — IBUPROFEN 600 MG: 600 TABLET ORAL at 09:26

## 2021-08-02 RX ADMIN — IBUPROFEN 600 MG: 600 TABLET ORAL at 23:57

## 2021-08-02 RX ADMIN — ACETAMINOPHEN 650 MG: 325 TABLET, FILM COATED ORAL at 23:57

## 2021-08-02 RX ADMIN — ACETAMINOPHEN 650 MG: 325 TABLET, FILM COATED ORAL at 09:26

## 2021-08-02 RX ADMIN — IBUPROFEN 600 MG: 600 TABLET ORAL at 03:26

## 2021-08-02 RX ADMIN — VITAMIN A, VITAMIN C, VITAMIN D, VITAMIN E, THIAMINE, RIBOFLAVIN, NIACIN, VITAMIN B6, FOLIC ACID, VITAMIN B12, CALCIUM, IRON, ZINC, COPPER 1 TABLET: 4000; 120; 400; 22; 1.84; 3; 20; 10; 1; 12; 200; 27; 25; 2 TABLET ORAL at 09:26

## 2021-08-02 RX ADMIN — IBUPROFEN 600 MG: 600 TABLET ORAL at 18:07

## 2021-08-02 RX ADMIN — ACETAMINOPHEN 650 MG: 325 TABLET, FILM COATED ORAL at 03:26

## 2021-08-02 RX ADMIN — ACETAMINOPHEN 650 MG: 325 TABLET, FILM COATED ORAL at 18:07

## 2021-08-02 ASSESSMENT — PAIN SCALES - PAIN ASSESSMENT IN ADVANCED DEMENTIA (PAINAD)
CONSOLABILITY: NO NEED TO CONSOLE
BREATHING: NORMAL
BREATHING: NORMAL
TOTALSCORE: 0
CONSOLABILITY: NO NEED TO CONSOLE
FACIALEXPRESSION: SMILING OR INEXPRESSIVE
BODYLANGUAGE: RELAXED
FACIALEXPRESSION: SMILING OR INEXPRESSIVE
BREATHING: NORMAL
TOTALSCORE: 0
BODYLANGUAGE: RELAXED

## 2021-08-02 ASSESSMENT — PAIN SCALES - WONG BAKER
WONGBAKER_NUMERICALRESPONSE: 3
WONGBAKER_NUMERICALRESPONSE: 4

## 2021-08-02 ASSESSMENT — PAIN SCALES - BEHAVIORAL PAIN SCALE (BPS): BPS_SCORE: 3

## 2021-08-02 ASSESSMENT — PAIN SCALES - GENERAL
PAINLEVEL_OUTOF10: 4
PAINLEVEL_OUTOF10: 4
PAINLEVEL_OUTOF10: 3
PAINLEVEL_OUTOF10: 5

## 2021-08-03 VITALS
SYSTOLIC BLOOD PRESSURE: 116 MMHG | WEIGHT: 199 LBS | DIASTOLIC BLOOD PRESSURE: 73 MMHG | BODY MASS INDEX: 31.98 KG/M2 | HEART RATE: 79 BPM | HEIGHT: 66 IN | RESPIRATION RATE: 18 BRPM | OXYGEN SATURATION: 98 % | TEMPERATURE: 97.9 F

## 2021-08-03 PROCEDURE — 10002803 HB RX 637

## 2021-08-03 PROCEDURE — 10004651 HB RX, NO CHARGE ITEM

## 2021-08-03 RX ADMIN — ACETAMINOPHEN 650 MG: 325 TABLET, FILM COATED ORAL at 06:17

## 2021-08-03 RX ADMIN — IBUPROFEN 600 MG: 600 TABLET ORAL at 12:16

## 2021-08-03 RX ADMIN — IBUPROFEN 600 MG: 600 TABLET ORAL at 06:18

## 2021-08-03 RX ADMIN — ACETAMINOPHEN 650 MG: 325 TABLET, FILM COATED ORAL at 12:16

## 2021-08-03 ASSESSMENT — PAIN SCALES - GENERAL
PAINLEVEL_OUTOF10: 4
PAINLEVEL_OUTOF10: 4
PAINLEVEL_OUTOF10: 3
PAINLEVEL_OUTOF10: 4

## 2021-08-04 LAB
ASR DISCLAIMER: NORMAL
CASE RPRT: NORMAL
CLINICAL INFO: NORMAL
PATH REPORT.FINAL DX SPEC: NORMAL
PATH REPORT.GROSS SPEC: NORMAL

## 2021-11-17 ENCOUNTER — OFFICE VISIT (OUTPATIENT)
Dept: FAMILY MEDICINE CLINIC | Facility: CLINIC | Age: 31
End: 2021-11-17
Payer: COMMERCIAL

## 2021-11-17 VITALS
BODY MASS INDEX: 30.53 KG/M2 | HEIGHT: 66 IN | DIASTOLIC BLOOD PRESSURE: 69 MMHG | SYSTOLIC BLOOD PRESSURE: 103 MMHG | RESPIRATION RATE: 16 BRPM | WEIGHT: 190 LBS | HEART RATE: 84 BPM

## 2021-11-17 DIAGNOSIS — Z86.32 HISTORY OF GESTATIONAL DIABETES: Primary | ICD-10-CM

## 2021-11-17 PROCEDURE — 3078F DIAST BP <80 MM HG: CPT | Performed by: FAMILY MEDICINE

## 2021-11-17 PROCEDURE — 3008F BODY MASS INDEX DOCD: CPT | Performed by: FAMILY MEDICINE

## 2021-11-17 PROCEDURE — 3074F SYST BP LT 130 MM HG: CPT | Performed by: FAMILY MEDICINE

## 2021-11-17 PROCEDURE — 99214 OFFICE O/P EST MOD 30 MIN: CPT | Performed by: FAMILY MEDICINE

## 2021-11-17 PROCEDURE — 83036 HEMOGLOBIN GLYCOSYLATED A1C: CPT | Performed by: FAMILY MEDICINE

## 2021-11-17 RX ORDER — IBUPROFEN 600 MG/1
600 TABLET ORAL EVERY 6 HOURS
COMMUNITY
Start: 2021-08-02

## 2021-11-17 RX ORDER — DOCUSATE SODIUM -SENNOSIDES 50; 8.6 MG/1; MG/1
TABLET, COATED ORAL
COMMUNITY
Start: 2021-08-05

## 2021-11-17 RX ORDER — ACETAMINOPHEN 325 MG/1
650 TABLET ORAL EVERY 6 HOURS
COMMUNITY
Start: 2021-08-02

## 2021-11-17 RX ORDER — ESCITALOPRAM OXALATE 10 MG/1
10 TABLET ORAL NIGHTLY
COMMUNITY
Start: 2021-06-02

## 2021-11-17 RX ORDER — AMOXICILLIN 250 MG
2 CAPSULE ORAL
COMMUNITY
Start: 2021-08-02

## 2021-11-17 NOTE — PROGRESS NOTES
Roger Batesambar is a 31year old female. Patient presents with:  Hospital F/U: Patient is not checking Glucose in everyday basis and wants to repeat Labs. No fasting. Patient denies pain today.        HPI:   Patient is a 29-year-old female presents today OF SYSTEMS:   GENERAL HEALTH: No fevers, chills, sweats, fatigue  VISION: No recent vision problems, blurry vision or double vision  HEENT: No decreased hearing ear pain nasal congestion or sore throat  SKIN: denies any unusual skin lesions or rashes  RESP

## 2023-03-11 ENCOUNTER — WALK IN (OUTPATIENT)
Dept: URGENT CARE | Age: 33
End: 2023-03-11

## 2023-03-11 VITALS
WEIGHT: 205 LBS | OXYGEN SATURATION: 98 % | BODY MASS INDEX: 32.95 KG/M2 | HEART RATE: 95 BPM | SYSTOLIC BLOOD PRESSURE: 118 MMHG | HEIGHT: 66 IN | RESPIRATION RATE: 17 BRPM | TEMPERATURE: 99 F | DIASTOLIC BLOOD PRESSURE: 79 MMHG

## 2023-03-11 DIAGNOSIS — B37.0 CANDIDIASIS OF MOUTH: ICD-10-CM

## 2023-03-11 DIAGNOSIS — B37.0 CANDIDIASIS OF MOUTH: Primary | ICD-10-CM

## 2023-03-11 DIAGNOSIS — J02.9 PHARYNGITIS, UNSPECIFIED ETIOLOGY: ICD-10-CM

## 2023-03-11 DIAGNOSIS — J02.9 SORE THROAT: ICD-10-CM

## 2023-03-11 LAB
S PYO DNA THROAT QL NAA+PROBE: NOT DETECTED
TEST LOT EXPIRATION DATE: NORMAL
TEST LOT NUMBER: NORMAL

## 2023-03-11 PROCEDURE — 99205 OFFICE O/P NEW HI 60 MIN: CPT | Performed by: NURSE PRACTITIONER

## 2023-03-11 PROCEDURE — 87651 STREP A DNA AMP PROBE: CPT | Performed by: NURSE PRACTITIONER

## 2023-03-11 ASSESSMENT — PAIN SCALES - GENERAL: PAINLEVEL: 2

## 2023-03-11 ASSESSMENT — ENCOUNTER SYMPTOMS
TROUBLE SWALLOWING: 0
VOMITING: 0
DIARRHEA: 0
HOARSE VOICE: 0
SWOLLEN GLANDS: 0
HEADACHES: 0
ABDOMINAL PAIN: 0
SHORTNESS OF BREATH: 0
SORE THROAT: 1
STRIDOR: 0
COUGH: 0

## 2023-03-22 ENCOUNTER — OFFICE VISIT (OUTPATIENT)
Dept: FAMILY MEDICINE CLINIC | Facility: CLINIC | Age: 33
End: 2023-03-22

## 2023-03-22 ENCOUNTER — LAB ENCOUNTER (OUTPATIENT)
Dept: LAB | Age: 33
End: 2023-03-22
Attending: FAMILY MEDICINE
Payer: MEDICAID

## 2023-03-22 VITALS
SYSTOLIC BLOOD PRESSURE: 113 MMHG | TEMPERATURE: 98 F | BODY MASS INDEX: 31.34 KG/M2 | DIASTOLIC BLOOD PRESSURE: 77 MMHG | HEIGHT: 66 IN | HEART RATE: 89 BPM | WEIGHT: 195 LBS

## 2023-03-22 DIAGNOSIS — E11.9 TYPE 2 DIABETES MELLITUS WITHOUT COMPLICATION, WITHOUT LONG-TERM CURRENT USE OF INSULIN (HCC): Primary | ICD-10-CM

## 2023-03-22 DIAGNOSIS — E11.9 TYPE 2 DIABETES MELLITUS WITHOUT COMPLICATION, WITHOUT LONG-TERM CURRENT USE OF INSULIN (HCC): ICD-10-CM

## 2023-03-22 LAB
ALBUMIN SERPL-MCNC: 3.6 G/DL (ref 3.4–5)
ALBUMIN/GLOB SERPL: 1 {RATIO} (ref 1–2)
ALP LIVER SERPL-CCNC: 60 U/L
ALT SERPL-CCNC: 46 U/L
ANION GAP SERPL CALC-SCNC: 7 MMOL/L (ref 0–18)
AST SERPL-CCNC: 22 U/L (ref 15–37)
BASOPHILS # BLD AUTO: 0.03 X10(3) UL (ref 0–0.2)
BASOPHILS NFR BLD AUTO: 0.5 %
BILIRUB SERPL-MCNC: 0.5 MG/DL (ref 0.1–2)
BUN BLD-MCNC: 7 MG/DL (ref 7–18)
BUN/CREAT SERPL: 7.6 (ref 10–20)
CALCIUM BLD-MCNC: 9 MG/DL (ref 8.5–10.1)
CARTRIDGE LOT#: 30 NUMERIC
CHLORIDE SERPL-SCNC: 108 MMOL/L (ref 98–112)
CHOLEST SERPL-MCNC: 136 MG/DL (ref ?–200)
CO2 SERPL-SCNC: 24 MMOL/L (ref 21–32)
CREAT BLD-MCNC: 0.92 MG/DL
CREAT UR-SCNC: 208 MG/DL
DEPRECATED RDW RBC AUTO: 37.1 FL (ref 35.1–46.3)
EOSINOPHIL # BLD AUTO: 0.27 X10(3) UL (ref 0–0.7)
EOSINOPHIL NFR BLD AUTO: 4.8 %
ERYTHROCYTE [DISTWIDTH] IN BLOOD BY AUTOMATED COUNT: 11.5 % (ref 11–15)
FASTING PATIENT LIPID ANSWER: NO
FASTING STATUS PATIENT QL REPORTED: NO
GFR SERPLBLD BASED ON 1.73 SQ M-ARVRAT: 86 ML/MIN/1.73M2 (ref 60–?)
GLOBULIN PLAS-MCNC: 3.5 G/DL (ref 2.8–4.4)
GLUCOSE BLD-MCNC: 174 MG/DL (ref 70–99)
HCT VFR BLD AUTO: 36.3 %
HDLC SERPL-MCNC: 25 MG/DL (ref 40–59)
HEMOGLOBIN A1C: 6.6 % (ref 4.3–5.6)
HGB BLD-MCNC: 12.5 G/DL
IMM GRANULOCYTES # BLD AUTO: 0.03 X10(3) UL (ref 0–1)
IMM GRANULOCYTES NFR BLD: 0.5 %
LDLC SERPL CALC-MCNC: 86 MG/DL (ref ?–100)
LYMPHOCYTES # BLD AUTO: 1.41 X10(3) UL (ref 1–4)
LYMPHOCYTES NFR BLD AUTO: 24.9 %
MCH RBC QN AUTO: 30.5 PG (ref 26–34)
MCHC RBC AUTO-ENTMCNC: 34.4 G/DL (ref 31–37)
MCV RBC AUTO: 88.5 FL
MICROALBUMIN UR-MCNC: 7.06 MG/DL
MICROALBUMIN/CREAT 24H UR-RTO: 33.9 UG/MG (ref ?–30)
MONOCYTES # BLD AUTO: 0.36 X10(3) UL (ref 0.1–1)
MONOCYTES NFR BLD AUTO: 6.3 %
NEUTROPHILS # BLD AUTO: 3.57 X10 (3) UL (ref 1.5–7.7)
NEUTROPHILS # BLD AUTO: 3.57 X10(3) UL (ref 1.5–7.7)
NEUTROPHILS NFR BLD AUTO: 63 %
NONHDLC SERPL-MCNC: 111 MG/DL (ref ?–130)
OSMOLALITY SERPL CALC.SUM OF ELEC: 290 MOSM/KG (ref 275–295)
PLATELET # BLD AUTO: 364 10(3)UL (ref 150–450)
POTASSIUM SERPL-SCNC: 3.9 MMOL/L (ref 3.5–5.1)
PROT SERPL-MCNC: 7.1 G/DL (ref 6.4–8.2)
RBC # BLD AUTO: 4.1 X10(6)UL
SODIUM SERPL-SCNC: 139 MMOL/L (ref 136–145)
TRIGL SERPL-MCNC: 139 MG/DL (ref 30–149)
VLDLC SERPL CALC-MCNC: 22 MG/DL (ref 0–30)
WBC # BLD AUTO: 5.7 X10(3) UL (ref 4–11)

## 2023-03-22 PROCEDURE — 85025 COMPLETE CBC W/AUTO DIFF WBC: CPT | Performed by: FAMILY MEDICINE

## 2023-03-22 PROCEDURE — 80061 LIPID PANEL: CPT | Performed by: FAMILY MEDICINE

## 2023-03-22 PROCEDURE — 3044F HG A1C LEVEL LT 7.0%: CPT | Performed by: FAMILY MEDICINE

## 2023-03-22 PROCEDURE — 80053 COMPREHEN METABOLIC PANEL: CPT | Performed by: FAMILY MEDICINE

## 2023-03-22 PROCEDURE — 90677 PCV20 VACCINE IM: CPT | Performed by: FAMILY MEDICINE

## 2023-03-22 PROCEDURE — 36415 COLL VENOUS BLD VENIPUNCTURE: CPT | Performed by: FAMILY MEDICINE

## 2023-03-22 PROCEDURE — 90471 IMMUNIZATION ADMIN: CPT | Performed by: FAMILY MEDICINE

## 2023-03-22 PROCEDURE — 3074F SYST BP LT 130 MM HG: CPT | Performed by: FAMILY MEDICINE

## 2023-03-22 PROCEDURE — 3060F POS MICROALBUMINURIA REV: CPT

## 2023-03-22 PROCEDURE — 3008F BODY MASS INDEX DOCD: CPT | Performed by: FAMILY MEDICINE

## 2023-03-22 PROCEDURE — 82570 ASSAY OF URINE CREATININE: CPT

## 2023-03-22 PROCEDURE — 3078F DIAST BP <80 MM HG: CPT | Performed by: FAMILY MEDICINE

## 2023-03-22 PROCEDURE — 99214 OFFICE O/P EST MOD 30 MIN: CPT | Performed by: FAMILY MEDICINE

## 2023-03-22 PROCEDURE — 83036 HEMOGLOBIN GLYCOSYLATED A1C: CPT | Performed by: FAMILY MEDICINE

## 2023-03-22 PROCEDURE — 82043 UR ALBUMIN QUANTITATIVE: CPT

## 2023-03-22 PROCEDURE — 3061F NEG MICROALBUMINURIA REV: CPT

## 2023-03-22 RX ORDER — METFORMIN HYDROCHLORIDE 750 MG/1
750 TABLET, EXTENDED RELEASE ORAL DAILY
Qty: 30 TABLET | Refills: 0 | Status: SHIPPED | OUTPATIENT
Start: 2023-03-22

## 2023-04-10 ENCOUNTER — OFFICE VISIT (OUTPATIENT)
Dept: ENDOCRINOLOGY CLINIC | Facility: CLINIC | Age: 33
End: 2023-04-10

## 2023-04-10 VITALS — DIASTOLIC BLOOD PRESSURE: 70 MMHG | HEART RATE: 90 BPM | SYSTOLIC BLOOD PRESSURE: 104 MMHG

## 2023-04-10 DIAGNOSIS — E11.65 UNCONTROLLED TYPE 2 DIABETES MELLITUS WITH HYPERGLYCEMIA (HCC): Primary | ICD-10-CM

## 2023-04-10 PROCEDURE — 99243 OFF/OP CNSLTJ NEW/EST LOW 30: CPT

## 2023-04-10 PROCEDURE — 3078F DIAST BP <80 MM HG: CPT

## 2023-04-10 PROCEDURE — 3074F SYST BP LT 130 MM HG: CPT

## 2023-04-19 DIAGNOSIS — E11.9 TYPE 2 DIABETES MELLITUS WITHOUT COMPLICATION, WITHOUT LONG-TERM CURRENT USE OF INSULIN (HCC): ICD-10-CM

## 2023-04-20 RX ORDER — METFORMIN HYDROCHLORIDE 750 MG/1
750 TABLET, EXTENDED RELEASE ORAL DAILY
Qty: 90 TABLET | Refills: 3 | Status: SHIPPED | OUTPATIENT
Start: 2023-04-20

## 2023-04-20 NOTE — TELEPHONE ENCOUNTER
Refill passed per [employer] [employer] protocol. Pt already has one month follow up appointment scheduled.      Requested Prescriptions   Pending Prescriptions Disp Refills    METFORMIN  MG Oral Tablet 24 Hr [Pharmacy Med Name: METFORMIN ER 750MG 24HR TABS] 30 tablet 0     Sig: TAKE 1 TABLET(750 MG) BY MOUTH DAILY       Diabetes Medication Protocol Passed - 4/19/2023 10:03 AM        Passed - Last A1C < 7.5 and within past 6 months     Lab Results   Component Value Date    A1C 6.6 (A) 03/22/2023               Passed - In person appointment or virtual visit in the past 6 mos or appointment in next 3 mos     Recent Outpatient Visits              1 week ago Uncontrolled type 2 diabetes mellitus with hyperglycemia [facility])    [facility] [place], Candance Gaul, APRN    Office Visit    4 weeks ago Type 2 diabetes mellitus without complication, without long-term current use of insulin [facility])    Darrian Barbour MD    Office Visit    1 year ago History of gestational diabetes    [facility], [address], Lynn Arias MD    Office Visit    1 year ago [address] [place] [address] 183 Ashley Kincaid MD    Office Visit          Future Appointments         Provider Department Appt Notes    In 2 weeks Ashley Kincaid MD [address] [place] [address] 183 follow up    In 3 months Kamari Cr MD [address] [place] [address] dm  np               Passed - EGFRCR or GFRNAA > 50     GFR Evaluation  EGFRCR: 86 , resulted on 3/22/2023            Passed - GFR in the past 12 months             [unfilled]      @[facility]@

## 2023-05-10 ENCOUNTER — OFFICE VISIT (OUTPATIENT)
Dept: FAMILY MEDICINE CLINIC | Facility: CLINIC | Age: 33
End: 2023-05-10

## 2023-05-10 VITALS
BODY MASS INDEX: 30.7 KG/M2 | SYSTOLIC BLOOD PRESSURE: 104 MMHG | HEIGHT: 66 IN | WEIGHT: 191 LBS | DIASTOLIC BLOOD PRESSURE: 72 MMHG | HEART RATE: 85 BPM

## 2023-05-10 DIAGNOSIS — E11.65 UNCONTROLLED TYPE 2 DIABETES MELLITUS WITH HYPERGLYCEMIA, WITH LONG-TERM CURRENT USE OF INSULIN (HCC): Primary | ICD-10-CM

## 2023-05-10 DIAGNOSIS — Z79.4 UNCONTROLLED TYPE 2 DIABETES MELLITUS WITH HYPERGLYCEMIA, WITH LONG-TERM CURRENT USE OF INSULIN (HCC): Primary | ICD-10-CM

## 2023-05-10 PROCEDURE — 3078F DIAST BP <80 MM HG: CPT | Performed by: FAMILY MEDICINE

## 2023-05-10 PROCEDURE — 3074F SYST BP LT 130 MM HG: CPT | Performed by: FAMILY MEDICINE

## 2023-05-10 PROCEDURE — 3008F BODY MASS INDEX DOCD: CPT | Performed by: FAMILY MEDICINE

## 2023-05-10 PROCEDURE — 99395 PREV VISIT EST AGE 18-39: CPT | Performed by: FAMILY MEDICINE

## 2023-05-10 RX ORDER — ATORVASTATIN CALCIUM 10 MG/1
10 TABLET, FILM COATED ORAL NIGHTLY
Qty: 90 TABLET | Refills: 3 | Status: SHIPPED | OUTPATIENT
Start: 2023-05-10

## 2023-07-25 ENCOUNTER — OFFICE VISIT (OUTPATIENT)
Dept: OPHTHALMOLOGY | Facility: CLINIC | Age: 33
End: 2023-07-25

## 2023-07-25 DIAGNOSIS — H52.13 MYOPIA OF BOTH EYES WITH ASTIGMATISM: ICD-10-CM

## 2023-07-25 DIAGNOSIS — E11.9 DIABETES MELLITUS TYPE 2 WITHOUT RETINOPATHY (HCC): Primary | ICD-10-CM

## 2023-07-25 DIAGNOSIS — H52.203 MYOPIA OF BOTH EYES WITH ASTIGMATISM: ICD-10-CM

## 2023-07-25 PROCEDURE — 92015 DETERMINE REFRACTIVE STATE: CPT | Performed by: OPHTHALMOLOGY

## 2023-07-25 PROCEDURE — 2023F DILAT RTA XM W/O RTNOPTHY: CPT | Performed by: OPHTHALMOLOGY

## 2023-07-25 PROCEDURE — 92004 COMPRE OPH EXAM NEW PT 1/>: CPT | Performed by: OPHTHALMOLOGY

## 2023-07-25 NOTE — PATIENT INSTRUCTIONS
Diabetes mellitus type 2 without retinopathy ([facility] [address]  Diabetes type II: no background of retinopathy, no signs of neovascularization noted. Discussed ocular and systemic benefits of blood sugar control. Diagnosis and treatment discussed in detail with patient. Myopia of both eyes with astigmatism  Mild glasses Rx given. Update as needed.

## 2023-07-25 NOTE — PROGRESS NOTES
Johnny Ng is a 29year old female. HPI:     HPI    New patient here for a diabetic exam per Dr. Beverly Hussein. Patient complains of blurry vision at distance. She wears glasses but forgot to bring them with her today. Pt has been a diabetic for 2 months (pt had gestational diabetes with 2 pregnancies)       Pt's diabetes is currently controlled by pills  Pt does not check her BS   Pt's last blood sugar was  174 on 3/22/23  Last HA1C was 6.6 on 3/22/23  Endocrinologist: Baudilio HAILE  Last edited by Julianna Jasso OT on 2023  4:24 PM.        Patient History:  Past Medical History:   Diagnosis Date    Gestational diabetes        Surgical History: Johnny Ng has no past surgical history on file. Family History   Problem Relation Age of Onset    Diabetes Mother     Glaucoma Neg     Macular degeneration Neg        Social History:   Social History     Socioeconomic History    Marital status: Single   Tobacco Use    Smoking status: Former     Types: Cigarettes     Quit date: 10/1/2020     Years since quittin.8    Smokeless tobacco: Former   Vaping Use    Vaping Use: Never used   Substance and Sexual Activity    Alcohol use: Yes     Comment: occasional    Drug use: Yes     Types: Cannabis     Comment: occasionally       Medications:  Current Outpatient Medications   Medication Sig Dispense Refill    atorvastatin 10 MG Oral Tab Take 1 tablet (10 mg total) by mouth nightly. 90 tablet 3    metFORMIN  MG Oral Tablet 24 Hr Take 1 tablet (750 mg total) by mouth daily. 90 tablet 3    Glucose Blood In Vitro Strip Test blood sugar 4 times daily as directed. Diagnosis: GDM.  Meter: Accu-chek Guide (Patient not taking: Reported on 2023)         Allergies:    Melons                  OTHER (SEE COMMENTS)    Comment:Itchy throat    ROS:     ROS    Positive for: Endocrine, Eyes  Negative for: Constitutional, Gastrointestinal, Neurological, Skin, Genitourinary, Musculoskeletal, HENT, Cardiovascular, Respiratory, Psychiatric, Allergic/Imm, Heme/Lymph  Last edited by Pauletta Gottron, OT on 7/25/2023  4:08 PM.          PHYSICAL EXAM:     Base Eye Exam       Visual Acuity (Snellen - Linear)         Right Left    Dist sc 20/20 20/20    Near sc 20/20 20/20              Tonometry (Icare, 4:16 PM)         Right Left    Pressure 20 20              Pupils         Pupils    Right PERRL    Left PERRL              Visual Fields         Left Right     Full Full              Extraocular Movement         Right Left     Full, Ortho Full, Ortho              Neuro/Psych       Oriented x3:  Yes              Dilation       Both eyes: 1.0% Mydriacyl and 2.5% Kenroy Synephrine @ 4:17 PM              Dilation #2       Both eyes: 1.0% Mydriacyl and 2.5% [place]roy Synephrine @ 4:17 PM                  Slit Lamp and Fundus Exam       Slit Lamp Exam         Right Left    Lids/Lashes Meibomian gland dysfunction Meibomian gland dysfunction    Conjunctiva/Sclera Normal Normal    Cornea Clear Clear    Anterior Chamber Deep and quiet Deep and quiet    Iris Normal Normal    Lens Clear Clear    Vitreous Clear Clear              Fundus Exam         Right Left    Disc Good rim Good rim    C/D Ratio 0.3 0.3    Macula Normal, no BDR Normal, no BDR    Vessels Normal Normal    Periphery Normal Normal                  Refraction       Wearing Rx       Type: Forgot glasses              Cycloplegic Refraction (Auto)         Sphere Cylinder Axis Dist VA    Right -0.25 +0.50 135     Left -0.75 +0.75 065               Cycloplegic Refraction #2         Sphere Cylinder Axis Dist VA    Right -0.25 +0.50 135 20/20    Left -0.75 +0.75 065 20/20              Final Rx         Sphere Cylinder Axis Dist VA    Right -0.25 +0.50 135 20/20    Left -0.75 +0.75 065 20/20      Type: Single vision                     ASSESSMENT/PLAN:     Diagnoses and Plan:     Diabetes mellitus type 2 without retinopathy ([facility] [address]  Diabetes type II: no background of retinopathy, no signs of neovascularization noted. Discussed ocular and systemic benefits of blood sugar control. Diagnosis and treatment discussed in detail with patient. Myopia of both eyes with astigmatism  Mild glasses Rx given. Update as needed. No orders of the defined types were placed in this encounter.       Meds This Visit:  Requested Prescriptions      No prescriptions requested or ordered in this encounter        Follow up instructions:  Return in about 1 year (around 7/25/2024) for DM Eye Exam.    7/25/2023  Scribed by: Ramon Boyle MD

## 2023-10-24 ENCOUNTER — OFFICE VISIT (OUTPATIENT)
Dept: FAMILY MEDICINE CLINIC | Facility: CLINIC | Age: 33
End: 2023-10-24

## 2023-10-24 VITALS
RESPIRATION RATE: 16 BRPM | SYSTOLIC BLOOD PRESSURE: 118 MMHG | WEIGHT: 187 LBS | HEART RATE: 97 BPM | BODY MASS INDEX: 30 KG/M2 | TEMPERATURE: 98 F | DIASTOLIC BLOOD PRESSURE: 74 MMHG

## 2023-10-24 DIAGNOSIS — E11.9 TYPE 2 DIABETES MELLITUS WITHOUT COMPLICATION, WITHOUT LONG-TERM CURRENT USE OF INSULIN (HCC): Primary | ICD-10-CM

## 2023-10-24 DIAGNOSIS — Z3A.09 9 WEEKS GESTATION OF PREGNANCY: ICD-10-CM

## 2023-10-24 LAB
DEPRECATED RDW RBC AUTO: 41.2 FL (ref 35.1–46.3)
ERYTHROCYTE [DISTWIDTH] IN BLOOD BY AUTOMATED COUNT: 12.1 % (ref 11–15)
EST. AVERAGE GLUCOSE BLD GHB EST-MCNC: 126 MG/DL (ref 68–126)
HBA1C MFR BLD: 6 % (ref ?–5.7)
HCT VFR BLD AUTO: 37.2 %
HGB BLD-MCNC: 12.2 G/DL
MCH RBC QN AUTO: 30.3 PG (ref 26–34)
MCHC RBC AUTO-ENTMCNC: 32.8 G/DL (ref 31–37)
MCV RBC AUTO: 92.3 FL
PLATELET # BLD AUTO: 337 10(3)UL (ref 150–450)
RBC # BLD AUTO: 4.03 X10(6)UL
WBC # BLD AUTO: 11 X10(3) UL (ref 4–11)

## 2023-10-24 PROCEDURE — 3044F HG A1C LEVEL LT 7.0%: CPT | Performed by: FAMILY MEDICINE

## 2023-10-24 PROCEDURE — 3074F SYST BP LT 130 MM HG: CPT | Performed by: FAMILY MEDICINE

## 2023-10-24 PROCEDURE — 3078F DIAST BP <80 MM HG: CPT | Performed by: FAMILY MEDICINE

## 2023-10-24 PROCEDURE — 99214 OFFICE O/P EST MOD 30 MIN: CPT | Performed by: FAMILY MEDICINE

## 2023-10-25 ENCOUNTER — TELEPHONE (OUTPATIENT)
Dept: ENDOCRINOLOGY CLINIC | Facility: CLINIC | Age: 33
End: 2023-10-25

## 2023-10-25 NOTE — TELEPHONE ENCOUNTER
Spoke with patient and offered appt this week in cancellation spots. Patient unable to come she is out of town.

## 2023-10-25 NOTE — TELEPHONE ENCOUNTER
ELLIS [address] [place] [address],     Please advise bold     Per Dr. Tejas Devries, requesting for patient to be seen as soon as possible for Type 2 diabetes and 9 weeks pregnant. Called patient to schedule, no answer, left      LOV: 4/10/23 with CRISTÓBAL Rivera.  [address], please advise if okay to see you, next availability: 10/27 at 3:15pm (VV slot)

## 2023-10-25 NOTE — TELEPHONE ENCOUNTER
Patient would need to be seen in person as a newly pregnant patient. I do not have any openings for in person visits this week and will not be in the office next week. Dr. Vishal Nazario, Dr. Modesta Beaver and Dr. Prudencio Lees,       Are you able to add patient on to your schedule within the next week? Thank you!

## 2023-10-26 NOTE — TELEPHONE ENCOUNTER
Called patient and offered 10/30/23 appt    Future Appointments   Date Time Provider Tiffanie Jasmin   10/30/2023  3:15 PM Flaquito Schwartz MD [facility]     Pt accepted and RN gave address to [facility].

## 2023-10-30 ENCOUNTER — TELEPHONE (OUTPATIENT)
Dept: ENDOCRINOLOGY CLINIC | Facility: CLINIC | Age: 33
End: 2023-10-30

## 2023-10-30 ENCOUNTER — OFFICE VISIT (OUTPATIENT)
Dept: ENDOCRINOLOGY CLINIC | Facility: CLINIC | Age: 33
End: 2023-10-30

## 2023-10-30 VITALS
BODY MASS INDEX: 30.37 KG/M2 | WEIGHT: 189 LBS | HEIGHT: 65.98 IN | DIASTOLIC BLOOD PRESSURE: 73 MMHG | SYSTOLIC BLOOD PRESSURE: 109 MMHG | HEART RATE: 96 BPM

## 2023-10-30 DIAGNOSIS — E11.9 CONTROLLED TYPE 2 DIABETES MELLITUS WITHOUT COMPLICATION, WITHOUT LONG-TERM CURRENT USE OF INSULIN (HCC): Primary | ICD-10-CM

## 2023-10-30 LAB
CARTRIDGE LOT#: ABNORMAL NUMERIC
GLUCOSE BLOOD: 113
HEMOGLOBIN A1C: 6 % (ref 4.3–5.6)
TEST STRIP LOT #: NORMAL NUMERIC

## 2023-10-30 RX ORDER — INSULIN GLARGINE 100 [IU]/ML
8 INJECTION, SOLUTION SUBCUTANEOUS NIGHTLY
Qty: 15 ML | Refills: 1 | Status: SHIPPED | OUTPATIENT
Start: 2023-10-30

## 2023-10-30 RX ORDER — PEN NEEDLE, DIABETIC 32GX 5/32"
NEEDLE, DISPOSABLE MISCELLANEOUS
Qty: 100 EACH | Refills: 1 | Status: SHIPPED | OUTPATIENT
Start: 2023-10-30

## 2023-10-30 NOTE — PATIENT INSTRUCTIONS
START Lantus 8 units subcutaneous at bedtime    CHECK BG Levels 4 times daily and send via [employer]     CONTINUE Metformin

## 2023-10-30 NOTE — TELEPHONE ENCOUNTER
Patient's phone was close to being off in clinic. Set up Dexcom G7 april but not clarity due to battery life.      [employer] message sent to set up at home

## 2023-10-30 NOTE — TELEPHONE ENCOUNTER
Started PA via [employer] paper, printed out LOV note and faxed to [employer].  Placed PA in brown bin     Awaiting determination of PA

## 2023-11-02 NOTE — TELEPHONE ENCOUNTER
Received fax from [facility] [address] dated on 10/31/23 stating the Dexcom g7 sensor,  has been approved and valid form 08/02/23 and ends 10/31/24.     AQ#[id number]    CASE#[id number]  [facility] sent

## 2023-11-09 ENCOUNTER — WALK IN (OUTPATIENT)
Dept: URGENT CARE | Age: 33
End: 2023-11-09

## 2023-11-09 VITALS
HEART RATE: 90 BPM | BODY MASS INDEX: 29.62 KG/M2 | RESPIRATION RATE: 18 BRPM | OXYGEN SATURATION: 100 % | SYSTOLIC BLOOD PRESSURE: 112 MMHG | HEIGHT: 66 IN | WEIGHT: 184.3 LBS | TEMPERATURE: 97.7 F | DIASTOLIC BLOOD PRESSURE: 70 MMHG

## 2023-11-09 DIAGNOSIS — J01.90 ACUTE BACTERIAL RHINOSINUSITIS: Primary | ICD-10-CM

## 2023-11-09 DIAGNOSIS — B96.89 ACUTE BACTERIAL RHINOSINUSITIS: Primary | ICD-10-CM

## 2023-11-09 PROCEDURE — 99214 OFFICE O/P EST MOD 30 MIN: CPT | Performed by: NURSE PRACTITIONER

## 2023-11-09 RX ORDER — AMOXICILLIN AND CLAVULANATE POTASSIUM 875; 125 MG/1; MG/1
1 TABLET, FILM COATED ORAL 2 TIMES DAILY
Qty: 14 TABLET | Refills: 0 | Status: SHIPPED | OUTPATIENT
Start: 2023-11-09 | End: 2023-11-16

## 2023-11-13 ENCOUNTER — TELEPHONE (OUTPATIENT)
Dept: ENDOCRINOLOGY CLINIC | Facility: CLINIC | Age: 33
End: 2023-11-13

## 2023-11-13 NOTE — TELEPHONE ENCOUNTER
Insulin Pen Needle (BD PEN NEEDLE MALISSA U/F) 32G X 4 MM Does not apply Misc, Inject once daily, Disp: 100 each, Rfl: 1    Plan does not cover this medication. Please call plan at [phone] to initiate PA or call pharmacy to change medication.  Pt ID# is [id number]

## 2023-11-27 ENCOUNTER — OFFICE VISIT (OUTPATIENT)
Dept: ENDOCRINOLOGY CLINIC | Facility: CLINIC | Age: 33
End: 2023-11-27

## 2023-11-27 ENCOUNTER — TELEPHONE (OUTPATIENT)
Dept: ENDOCRINOLOGY CLINIC | Facility: CLINIC | Age: 33
End: 2023-11-27

## 2023-11-27 VITALS
BODY MASS INDEX: 30.53 KG/M2 | SYSTOLIC BLOOD PRESSURE: 108 MMHG | DIASTOLIC BLOOD PRESSURE: 72 MMHG | HEIGHT: 66 IN | HEART RATE: 92 BPM | WEIGHT: 190 LBS

## 2023-11-27 DIAGNOSIS — O24.112 PREGNANCY COMPLICATED BY PRE-EXISTING TYPE 2 DIABETES IN SECOND TRIMESTER: Primary | ICD-10-CM

## 2023-11-27 LAB
CARTRIDGE LOT#: NORMAL NUMERIC
GLUCOSE BLOOD: 154
HEMOGLOBIN A1C: 5.5 % (ref 4.3–5.6)
TEST STRIP LOT #: NORMAL NUMERIC

## 2023-11-27 PROCEDURE — 3078F DIAST BP <80 MM HG: CPT

## 2023-11-27 PROCEDURE — 3044F HG A1C LEVEL LT 7.0%: CPT

## 2023-11-27 PROCEDURE — 3074F SYST BP LT 130 MM HG: CPT

## 2023-11-27 PROCEDURE — 82947 ASSAY GLUCOSE BLOOD QUANT: CPT

## 2023-11-27 PROCEDURE — 83036 HEMOGLOBIN GLYCOSYLATED A1C: CPT

## 2023-11-27 PROCEDURE — 99214 OFFICE O/P EST MOD 30 MIN: CPT

## 2023-11-27 PROCEDURE — 3008F BODY MASS INDEX DOCD: CPT

## 2023-11-27 RX ORDER — INSULIN LISPRO 100 [IU]/ML
4 INJECTION, SOLUTION INTRAVENOUS; SUBCUTANEOUS
Qty: 15 ML | Refills: 4 | Status: SHIPPED | OUTPATIENT
Start: 2023-11-27 | End: 2023-11-28

## 2023-11-27 RX ORDER — ACYCLOVIR 400 MG/1
1 TABLET ORAL
Qty: 3 EACH | Refills: 5 | Status: SHIPPED | OUTPATIENT
Start: 2023-11-27

## 2023-11-27 NOTE — PATIENT INSTRUCTIONS
Increase Lantus to 10 units subcutaneous daily at bedtime    Start Humalog 4 units three times daily with meals     Stop Metformin       Return for visit in 4 weeks and restart Dexcom G7 sensor. Send weekly message so that we can review your blood sugars and adjust the insulin as discussed.

## 2023-11-27 NOTE — TELEPHONE ENCOUNTER
Patient came in to office today for visit with APRN and was instructed to return to clinic in 4 weeks. Routing to provider to advise when she'd like to add patient on to her schedule.

## 2023-12-21 ENCOUNTER — PATIENT MESSAGE (OUTPATIENT)
Dept: FAMILY MEDICINE CLINIC | Facility: CLINIC | Age: 33
End: 2023-12-21

## 2023-12-21 NOTE — TELEPHONE ENCOUNTER
From: Mayela Mccarty  To: Valorie Zafar  Sent: 12/21/2023 1:10 PM CST  Subject: Rozena Coombes Dr. Verita Galeazzi, I just got covid is there anything I need to take or do?

## 2023-12-26 ENCOUNTER — OFFICE VISIT (OUTPATIENT)
Dept: OBGYN CLINIC | Facility: CLINIC | Age: 33
End: 2023-12-26
Payer: MEDICAID

## 2023-12-26 ENCOUNTER — LAB ENCOUNTER (OUTPATIENT)
Dept: LAB | Facility: REFERENCE LAB | Age: 33
End: 2023-12-26
Attending: OBSTETRICS & GYNECOLOGY
Payer: MEDICAID

## 2023-12-26 VITALS — HEIGHT: 66 IN | BODY MASS INDEX: 29.89 KG/M2 | WEIGHT: 186 LBS

## 2023-12-26 DIAGNOSIS — O24.312 PREEXISTING DIABETES COMPLICATING PREGNANCY IN SECOND TRIMESTER, ANTEPARTUM: Primary | ICD-10-CM

## 2023-12-26 DIAGNOSIS — O24.312 PREEXISTING DIABETES COMPLICATING PREGNANCY IN SECOND TRIMESTER, ANTEPARTUM: ICD-10-CM

## 2023-12-26 PROBLEM — O24.319 PREEXISTING DIABETES COMPLICATING PREGNANCY, ANTEPARTUM: Status: ACTIVE | Noted: 2023-12-26

## 2023-12-26 PROBLEM — O24.319 PREEXISTING DIABETES COMPLICATING PREGNANCY, ANTEPARTUM (HCC): Status: ACTIVE | Noted: 2023-12-26

## 2023-12-26 LAB
ALBUMIN SERPL-MCNC: 3.8 G/DL (ref 3.2–4.8)
ALBUMIN/GLOB SERPL: 1.4 {RATIO} (ref 1–2)
ALP LIVER SERPL-CCNC: 41 U/L
ALT SERPL-CCNC: 16 U/L
ANION GAP SERPL CALC-SCNC: 6 MMOL/L (ref 0–18)
AST SERPL-CCNC: 14 U/L (ref ?–34)
BILIRUB SERPL-MCNC: 0.4 MG/DL (ref 0.3–1.2)
BUN BLD-MCNC: 6 MG/DL (ref 9–23)
BUN/CREAT SERPL: 10.3 (ref 10–20)
CALCIUM BLD-MCNC: 9.1 MG/DL (ref 8.7–10.4)
CHLORIDE SERPL-SCNC: 110 MMOL/L (ref 98–112)
CO2 SERPL-SCNC: 20 MMOL/L (ref 21–32)
CREAT BLD-MCNC: 0.58 MG/DL
CREAT UR-SCNC: 193.5 MG/DL
DEPRECATED RDW RBC AUTO: 40.1 FL (ref 35.1–46.3)
EGFRCR SERPLBLD CKD-EPI 2021: 124 ML/MIN/1.73M2 (ref 60–?)
ERYTHROCYTE [DISTWIDTH] IN BLOOD BY AUTOMATED COUNT: 12.6 % (ref 11–15)
EST. AVERAGE GLUCOSE BLD GHB EST-MCNC: 105 MG/DL (ref 68–126)
FASTING STATUS PATIENT QL REPORTED: NO
GLOBULIN PLAS-MCNC: 2.7 G/DL (ref 2.8–4.4)
GLUCOSE BLD-MCNC: 87 MG/DL (ref 70–99)
HBA1C MFR BLD: 5.3 % (ref ?–5.7)
HCT VFR BLD AUTO: 36 %
HGB BLD-MCNC: 12.2 G/DL
MCH RBC QN AUTO: 29.6 PG (ref 26–34)
MCHC RBC AUTO-ENTMCNC: 33.9 G/DL (ref 31–37)
MCV RBC AUTO: 87.4 FL
OSMOLALITY SERPL CALC.SUM OF ELEC: 279 MOSM/KG (ref 275–295)
PLATELET # BLD AUTO: 267 10(3)UL (ref 150–450)
POTASSIUM SERPL-SCNC: 4 MMOL/L (ref 3.5–5.1)
PROT SERPL-MCNC: 6.5 G/DL (ref 5.7–8.2)
PROT UR-MCNC: 18.2 MG/DL (ref ?–14)
PROT/CREAT UR-RTO: 0.09
RBC # BLD AUTO: 4.12 X10(6)UL
SODIUM SERPL-SCNC: 136 MMOL/L (ref 136–145)
WBC # BLD AUTO: 8.3 X10(3) UL (ref 4–11)

## 2023-12-26 PROCEDURE — 3044F HG A1C LEVEL LT 7.0%: CPT

## 2023-12-26 PROCEDURE — 99204 OFFICE O/P NEW MOD 45 MIN: CPT | Performed by: OBSTETRICS & GYNECOLOGY

## 2023-12-26 PROCEDURE — 85027 COMPLETE CBC AUTOMATED: CPT

## 2023-12-26 PROCEDURE — 84156 ASSAY OF PROTEIN URINE: CPT

## 2023-12-26 PROCEDURE — 83036 HEMOGLOBIN GLYCOSYLATED A1C: CPT

## 2023-12-26 PROCEDURE — 80053 COMPREHEN METABOLIC PANEL: CPT

## 2023-12-26 PROCEDURE — 36415 COLL VENOUS BLD VENIPUNCTURE: CPT

## 2023-12-26 PROCEDURE — 82570 ASSAY OF URINE CREATININE: CPT

## 2023-12-26 PROCEDURE — 82105 ALPHA-FETOPROTEIN SERUM: CPT

## 2023-12-26 PROCEDURE — 3008F BODY MASS INDEX DOCD: CPT | Performed by: OBSTETRICS & GYNECOLOGY

## 2023-12-27 ENCOUNTER — MED REC SCAN ONLY (OUTPATIENT)
Dept: OBGYN CLINIC | Facility: CLINIC | Age: 33
End: 2023-12-27

## 2023-12-27 ENCOUNTER — OFFICE VISIT (OUTPATIENT)
Dept: ENDOCRINOLOGY CLINIC | Facility: CLINIC | Age: 33
End: 2023-12-27
Payer: MEDICAID

## 2023-12-27 VITALS
DIASTOLIC BLOOD PRESSURE: 74 MMHG | SYSTOLIC BLOOD PRESSURE: 115 MMHG | HEART RATE: 81 BPM | BODY MASS INDEX: 30.05 KG/M2 | WEIGHT: 187 LBS | HEIGHT: 66 IN

## 2023-12-27 DIAGNOSIS — E11.9 CONTROLLED TYPE 2 DIABETES MELLITUS WITHOUT COMPLICATION, WITHOUT LONG-TERM CURRENT USE OF INSULIN (HCC): Primary | ICD-10-CM

## 2023-12-27 LAB
GLUCOSE BLOOD: 102
TEST STRIP LOT #: NORMAL NUMERIC

## 2023-12-27 PROCEDURE — 3074F SYST BP LT 130 MM HG: CPT

## 2023-12-27 PROCEDURE — 82947 ASSAY GLUCOSE BLOOD QUANT: CPT

## 2023-12-27 PROCEDURE — 99213 OFFICE O/P EST LOW 20 MIN: CPT

## 2023-12-27 PROCEDURE — 3078F DIAST BP <80 MM HG: CPT

## 2023-12-27 PROCEDURE — 3008F BODY MASS INDEX DOCD: CPT

## 2023-12-27 RX ORDER — INSULIN LISPRO 100 [IU]/ML
INJECTION, SOLUTION INTRAVENOUS; SUBCUTANEOUS
Qty: 15 ML | Refills: 3 | Status: SHIPPED | OUTPATIENT
Start: 2023-12-27

## 2023-12-27 RX ORDER — INSULIN GLARGINE 100 [IU]/ML
14 INJECTION, SOLUTION SUBCUTANEOUS NIGHTLY
Qty: 15 ML | Refills: 1 | Status: SHIPPED | OUTPATIENT
Start: 2023-12-27

## 2023-12-27 NOTE — PATIENT INSTRUCTIONS
Decrease Lantus to 14 units subcutaneous daily     Continue Humalog 10 with breakfast, 8 with lunch, and increase to 14 with dinner

## 2023-12-29 LAB
AFP MOM: 1.34
AFP VALUE: 41.2 NG/ML
GA ON COLL DATE: 18 WEEKS
INSULIN DEP AFP: YES
MAT AGE AT EDD: 31.4 YR
MULTIPLE GEST AFP: NO
OSBR RISK 1 IN AFP: 1276
WEIGHT AFP: 186 LBS

## 2024-01-02 DIAGNOSIS — E11.9 CONTROLLED TYPE 2 DIABETES MELLITUS WITHOUT COMPLICATION, WITHOUT LONG-TERM CURRENT USE OF INSULIN (HCC): ICD-10-CM

## 2024-01-03 RX ORDER — PEN NEEDLE, DIABETIC 32GX 5/32"
NEEDLE, DISPOSABLE MISCELLANEOUS
Qty: 100 EACH | Refills: 1 | Status: SHIPPED | OUTPATIENT
Start: 2024-01-03

## 2024-01-05 ENCOUNTER — TELEPHONE (OUTPATIENT)
Dept: OBGYN CLINIC | Facility: CLINIC | Age: 34
End: 2024-01-05

## 2024-01-05 NOTE — TELEPHONE ENCOUNTER
Called pt informed of normal prequel, declined gender but FOB was given phone and informed of female.  Agrees.

## 2024-01-12 ENCOUNTER — PATIENT MESSAGE (OUTPATIENT)
Dept: ENDOCRINOLOGY CLINIC | Facility: CLINIC | Age: 34
End: 2024-01-12

## 2024-01-13 NOTE — TELEPHONE ENCOUNTER
From: Kady Matthews  To: Aria Peña  Sent: 1/12/2024 7:55 PM CST  Subject: glucose check    I know you told me to do 14 or the long lasting so I did that for maybe 4days, and went back to 16. Because my fasting was in the 100+ range that was last week.

## 2024-01-17 NOTE — PROGRESS NOTES
Outpatient Maternal-Fetal Medicine Consultation    Dear Dr. Scott,    Thank you for requesting ultrasound evaluation and maternal fetal medicine consultation on your patient Kady Matthews.  As you are aware she is a 33 year old female with a Lowe pregnancy at 21w5d.  A maternal-fetal medicine consultation was requested secondary to Type II DM complicating pregnancy.  Her prenatal records and labs were reviewed.    Her prior pregnancies were complicated with GDM and she was diagnosed with Type II in 2023.  In preganncy she has been started on insulin and managed by endocrinology.  Her recent HgbA1c 23 was improved to 5.3% (from 6.9%).    First baby was small for gestational age at delivery.    HISTORY  OB History    Para Term  AB Living   4 2 2 0 1 2   SAB IAB Ectopic Multiple Live Births   1 0 0 0 2     # 1 - Date: , Sex: Male, Weight: 5 lb 7 oz (2.466 kg), GA: 37w0d, Delivery: Vaginal, Spontaneous, Apgar1: None, Apgar5: None, Living: Living, Birth Comments: gestational diabetes    # 2 - Date: , Sex: None, Weight: None, GA: 9w0d, Delivery: None, Apgar1: None, Apgar5: None, Living: None, Birth Comments: None    # 3 - Date: 21, Sex: Male, Weight: 6 lb 5.6 oz (2.88 kg), GA: 38w3d, Delivery: Normal spontaneous vaginal delivery, Apgar1: 9, Apgar5: 9, Living: Living, Birth Comments: None    # 4 - Date: None, Sex: None, Weight: None, GA: None, Delivery: None, Apgar1: None, Apgar5: None, Living: None, Birth Comments: None    Past Medical History  The patient  has a past medical history of Preexisting diabetes complicating pregnancy, antepartum.    Past Surgical History  The patient  has no past surgical history on file.    Family History  The patient She indicated that her mother is alive. She indicated that her father is alive. She indicated that her sister is alive. She indicated that her maternal grandmother is . She indicated that her maternal  grandfather is . She indicated that her paternal grandmother is . She indicated that her paternal grandfather is . She indicated that the status of her neg is unknown. She indicated that both of her paternal aunts are alive.      Medications:   Current Outpatient Medications:     Insulin Pen Needle (BD PEN NEEDLE MALISSA U/F) 32G X 4 MM Does not apply Misc, Inject once daily, Disp: 100 each, Rfl: 1    insulin glargine (LANTUS SOLOSTAR) 100 UNIT/ML Subcutaneous Solution Pen-injector, Inject 14 Units into the skin nightly., Disp: 15 mL, Rfl: 1    Insulin Lispro, 1 Unit Dial, (HUMALOG KWIKPEN) 100 UNIT/ML Subcutaneous Solution Pen-injector, Inject 10 units with breakfast, 8 units with lunch and 14 units with dinner., Disp: 15 mL, Rfl: 3    Continuous Blood Gluc Sensor (DEXCOM G7 SENSOR) Does not apply Misc, 1 each Every 10 days. Use as directed every 10 days, Disp: 3 each, Rfl: 5    Glucose Blood In Vitro Strip, , Disp: , Rfl:   Allergies:   Allergies   Allergen Reactions    Melons OTHER (SEE COMMENTS)     Itchy throat         PHYSICAL EXAMINATION:  /54   Pulse 116   Ht 5' 6\" (1.676 m)   Wt 186 lb (84.4 kg)   LMP 2023 (Exact Date)   BMI 30.02 kg/m²   General: alert and oriented,no acute distress  Abdomen: gravid, soft, non-tender  Extremities: non-tender, no edema      OBSTETRIC ULTRASOUND  The patient had a level 2 ultrasound today which I interpreted the results and reviewed them with the patient.    Ultrasound Findings:  Single IUP in breech presentation.    Placenta is anterior, high.   A 3 vessel cord is noted.  Cardiac activity is present at 167 bpm   g ( 1 lb 1 oz);    MVP is 5.2 cm .     The fetal measurements are consistent with the established EDC. No ultrasound evidence of structural abnormalities are seen today. The nasal bone is present. No ultrasound evidence of markers for aneuploidy are seen. She understands that ultrasound exam cannot exclude genetic  abnormalities and that genetic testing is recommended. The limitations of ultrasound were discussed.    See imaging tab for the complete US report.    DISCUSSION  During her visit we discussed and reviewed the following issues:  DIABETES MELLITUS - Dr. Fisher is her endocrinologist    Last eye exam - 2023; no retinopathy  Baseline p/c - 0.09  A1c - 6.9%  We discussed the risks associated with pregestational diabetes.  There is an increased risk of congenital malformations, fetal growth disturbances, preeclampsia, spontaneous  and intrauterine fetal demise (IUFD).  Infants of diabetic mothers (IDMs) are also at increased risk for hypoglycemia and hyperbilirubinemia.    Congenital Malformations:  Data from multiple studies have consistently shown a higher risk of major congenital malformations and miscarriage associated with increasing first trimester glycated hemoglobin values. Although glycated hemoglobin values from different laboratories may not be comparable because of differences in methodology and a lack of standardization among laboratories, a value >1 percent above the upper limit of the normal range is associated with an increased risk of congenital anomalies.  Patients with a markedly elevated glycated hemoglobin value have a markedly increased risk of congenital anomalies, particularly neural tube and cardiac defects. I informed the patient that more information about fetal development will be obtained from first and second trimester sonographic examinations and maternal serum analyte results.     Fetal Growth Disturbances:   Maternal diabetes mellitus may double the incidence of LGA infants; it also changes the anthropometric measurements of infants of diabetic mothers (IDMs) compared with offspring of women without diabetes. Specifically, the chest-to-head and shoulder-to-head ratios are increased in IDMs.  LGA fetuses are at increased risk for a prolonged second stage of labor, shoulder  dystocia, operative delivery, maternal and infant birth trauma, and  death. Maternal diabetes mellitus increases the likelihood of shoulder dystocia two- to six-fold compared to the population without diabetes and increases the likelihood of dystocia-associated fetal morbidity, such as brachial plexus injury. Accelerated fetal growth is also associated with an increased risk of  metabolic and physiologic disturbances. Continued control of blood glucose concentration during the third trimester is important to minimize the risk of these complications.   Impaired fetal growth is more common among women with diabetic vasculopathy and/or superimposed preeclampsia. It is associated with increased fetal and  morbidity and mortality, and has long-term health implications.  If there is evidence of intrauterine growth restriction, which is uncommon, but often related to preeclampsia or preexisting maternal vasculopathy, tests of fetal well-being are initiated.    Preeclampsia:  The incidences of hypertension and preeclampsia are increased in pregnant women with diabetes and are related to pregestational hypertension and vascular and renal disease. Poor glycemic control also appears to play a role.  Diagnosis and management of preeclampsia are similar to that in women without diabetes, except among those who enter pregnancy with preexisting nephropathy. In these women, diagnosing preeclampsia can be difficult and requires relying on deterioration of other markers.    Intrauterine Fetal Death (IUFD):  Intrauterine fetal demise is now a rare complication of diabetic pregnancy, primarily due to achievement of good glycemic control. The fetus of the diabetic mother is at risk for hypoxia primarily from two mechanisms: (1) fetal hyperglycemia and hyperinsulinemia increase fetal oxygen consumption, which may induce fetal hypoxemia and acidosis if the oxygen needs of the fetus are not met and (2) maternal  vasculopathy and hyperglycemia can lead to reduced uteroplacental perfusion, which may be associated with reduced fetal growth.  The American College of Obstetricians and Gynecologists (ACOG) recommends antepartum fetal testing for pregnancies complicated by pregestational diabetes. There are no data from large or randomized trials on which to make an evidenced-based recommendation as to which pregnancies complicated by diabetes should undergo fetal surveillance, when to start, what test to order, or how often to perform it.   Most experts agree starting weekly NST's at 32 weeks of gestation and increasing the frequency of testing to two times per week from 36 weeks until delivery. In complicated patients (IUGR, oligohydramnios, preeclampsia, or poorly controlled blood glucose concentrations) testing may start earlier in gestation and is performed more frequently. Any significant deterioration in maternal status necessitates reevaluation of the fetus. The frequency of intrauterine fetal death (excluding congenital malformations) with such protocols is approximately 3 per 1000 pregnancies in women with type 1 diabetes.    Glycemic Control:  We discussed goals of optimal glucose control: Fasting levels between 60 - 95, and - hour postprandial values of less than 120.  Blood sugars should be check at least 4 times daily and should be sent to the MFM office for weekly MFM physician review.     Maternal Testing:  Recommendations for pregnancy testing were discussed with the patient. I advised regular  opthomalogical evaluation (with repeat evaluation as indicated), ECG, 24 hour urine collection for protein and creatinine clearance, as well as serum labs including a complete metabolic profile, CBC and HgB A1C.    Fetal Evaluation:  Fetal testing was also reviewed with the patient.  First trimester dating and early anatomic assessment is advised as well as a targeted ultrasound at 20-22 weeks gestation and a fetal  echocardiogram at 22-24 weeks gestation.  In addition, growth ultrasounds should be performed every 4 weeks in the third trimester.  Finally, weekly NST evaluations should be initiated at 32 weeks gestation and increased to twice weekly at 36 weeks.      Glucose Control:  The patient's capillary blood glucose records were not reviewed today; her compliance with the recommended four times daily assessments (fasting and two-hour post-prandial) is  reportedly good .   Her overall glucose control is  improving .    Her compliance with her insulin regimen was reviewed and it is good.   Her current insulin regimen is:  Lantus  16 units qHS  Humalog 10 units with breakfast, 8 units with lunch, 14 units with dinner.    Medical Regimen Recommendation:   Continue to follow up closely with her endocrinology team    Prevention of Preeclampsia    Antiplatelet Agents  The observation that preeclampsia is associated with increased platelet turnover and increased platelet-derived thromboxane levels led to randomized trials evaluating low-dose aspirin therapy in women thought to be at increased risk of the disease. As opposed to higher dose aspirin therapy, low-dose aspirin (60 to 150 mg per day) diminishes platelet thromboxane synthesis while maintaining vascular wall prostacyclin synthesis. Although not well studied, the beneficial effect of low-dose aspirin for prevention of preeclampsia may also be in part related to modulation of inflammation. The drug has been studied for both prevention of preeclampsia and prevention of progression from mild to severe disease. It appears to result in a modest reduction in risk of preeclampsia when given to women at moderate to high risk of preeclampsia.  This approach has been studied in over 35,000 women, for both prevention of preeclampsia and prevention of progression of the disease. Low dose aspirin has a modest impact on pregnancy outcome; it reduces the risk of preeclampsia, as well as  other adverse pregnancy outcomes (eg,  delivery, fetal growth restriction) by about 10 to 20 percent. Low dose aspirin is safe in pregnancy; thus, it is a reasonable strategy in women with a moderate to high risk of preeclampsia in whom the benefits outweigh the risks.     Clinical Guidelines  Based on the available data, low-dose aspirin is advised for women at high risk for preeclampsia. There is no consensus on the criteria that confer high risk. The [place] Preventive Services Task Force (USPSTF) risk criteria are reasonable.  USPSTF criteria for high risk include one or more of the following:   Previous pregnancy with preeclampsia, especially early onset and with an adverse outcome   Multifetal gestation  Chronic hypertension   Type 1 or 2 diabetes mellitus  Chronic kidney disease  Autoimmune disease (antiphospholipid syndrome, systemic lupus erythematosus)     Women with multiple moderate risk factors for preeclampsia are considered high risk, as well. Identification of women with an appropriate combination of moderate risk factors to be considered high risk is subjective and determined case by case, as the data describing the magnitude of the association between each of these risk factors and development of preeclampsia vary widely and lack consistency. USPSTF criteria for moderate risk include:  Nulliparity  Obesity (body mass index >30 kg/m2)  Family history of preeclampsia in mother or sister  Age ?35 years  Sociodemographic characteristics (, low socioeconomic level)  Personal risk factors (eg, history of low birth weight or small for gestational age, previous adverse pregnancy outcome, >10 year pregnancy interval)     In light of her risk factor for preeclampsia, I advised that Kady take 1 or 2 tablets of low dose ASA daily.       We reviewed the signs and symptoms of preeclampsia,  labor and monitoring fetal movement.  We discussed reasons for her to call her  physician.    We discussed the recommended plan of care based on her  risk factors.  Kady and her sister had their questions answered to their satisfaction.    IMPRESSION:  IUP at 21w4d  Normal level 2 ultrasound  Type 2 diabetes complicating pregnancy  History of small for gestational age     RECOMMENDATIONS:  Continue care with   Check glucoses 4x/day & send her endocrinologist weekly for review  Fetal echocardiogram in 2 to 3 weeks  Monthly growth US in third trimester with BPP at or beyond 32 weeks  Weekly NST at    32 wks / twice weekly at  34  wks      Hgb A1C q trimester  Insulin endocrinology team  Aspirin  mg daily   Delivery for well controlled pre-existing diabetes without other complication 39-39w6d  Delivery for poorly controlled pre-existing diabetes with other complications 36-38w6d.      Total time spent 55 minutes this calendar day which includes preparing to see the patient including chart review, obtaining and/or reviewing additional medical history, performing a physical exam and evaluation, documenting clinical information in the electronic medical record, independently interpreting results, counseling the patient, communicating results to the patient/family/caregiver and coordinating care.     Case discussed with patient who demonstrated understanding and agreement with plan.     Thank you for allowing me to participate in the care of this patient.  Please feel free to contact me with any questions.    Tabatha Lynch MD  Maternal-Fetal Medicine       Note to patient and family:  The 21st Century Cures Act makes medical notes available to patients in the interest of transparency.  However, please be advised that this is a medical document.  It is intended as a peer to peer communication.  It is written in medical language and may contain abbreviations or verbiage that are technical and unfamiliar.  It may appear blunt or direct.  Medical documents are  intended to carry relevant information, facts as evident, and the clinical opinion of the practitioner.

## 2024-01-18 ENCOUNTER — HOSPITAL ENCOUNTER (OUTPATIENT)
Dept: PERINATAL CARE | Facility: HOSPITAL | Age: 34
Discharge: HOME | End: 2024-01-18
Attending: OBSTETRICS & GYNECOLOGY
Payer: MEDICAID

## 2024-01-18 VITALS
DIASTOLIC BLOOD PRESSURE: 54 MMHG | BODY MASS INDEX: 29.89 KG/M2 | HEART RATE: 116 BPM | SYSTOLIC BLOOD PRESSURE: 114 MMHG | HEIGHT: 66 IN | WEIGHT: 186 LBS

## 2024-01-18 DIAGNOSIS — E11.9 DIABETES MELLITUS TYPE 2 WITHOUT RETINOPATHY (HCC): ICD-10-CM

## 2024-01-18 DIAGNOSIS — E11.9 DIABETES MELLITUS TYPE 2 WITHOUT RETINOPATHY (HCC): Primary | ICD-10-CM

## 2024-01-18 DIAGNOSIS — O24.319 PREEXISTING DIABETES COMPLICATING PREGNANCY, ANTEPARTUM: ICD-10-CM

## 2024-01-18 DIAGNOSIS — O24.912 DIABETES MELLITUS AFFECTING PREGNANCY IN SECOND TRIMESTER: ICD-10-CM

## 2024-01-18 DIAGNOSIS — Z87.59 HISTORY OF PRIOR PREGNANCY WITH SMALL FOR GESTATIONAL AGE NEWBORN: ICD-10-CM

## 2024-01-18 PROCEDURE — 76811 OB US DETAILED SNGL FETUS: CPT | Performed by: OBSTETRICS & GYNECOLOGY

## 2024-01-18 NOTE — PROGRESS NOTES
Pt for level 2 US  HX neg NIPT per pt   Denies pregnancy complaints  States active fetus    Endo following blood glucose

## 2024-01-22 ENCOUNTER — MED REC SCAN ONLY (OUTPATIENT)
Dept: OBGYN CLINIC | Facility: CLINIC | Age: 34
End: 2024-01-22

## 2024-01-24 ENCOUNTER — ROUTINE PRENATAL (OUTPATIENT)
Dept: OBGYN CLINIC | Facility: CLINIC | Age: 34
End: 2024-01-24
Payer: MEDICAID

## 2024-01-24 VITALS
WEIGHT: 190 LBS | BODY MASS INDEX: 30.53 KG/M2 | SYSTOLIC BLOOD PRESSURE: 100 MMHG | HEIGHT: 66 IN | DIASTOLIC BLOOD PRESSURE: 58 MMHG

## 2024-01-24 DIAGNOSIS — O24.319 PREEXISTING DIABETES COMPLICATING PREGNANCY, ANTEPARTUM: Primary | ICD-10-CM

## 2024-01-24 PROCEDURE — 99212 OFFICE O/P EST SF 10 MIN: CPT | Performed by: OBSTETRICS & GYNECOLOGY

## 2024-01-24 PROCEDURE — 3078F DIAST BP <80 MM HG: CPT | Performed by: OBSTETRICS & GYNECOLOGY

## 2024-01-24 PROCEDURE — 3074F SYST BP LT 130 MM HG: CPT | Performed by: OBSTETRICS & GYNECOLOGY

## 2024-01-24 PROCEDURE — 3008F BODY MASS INDEX DOCD: CPT | Performed by: OBSTETRICS & GYNECOLOGY

## 2024-01-24 NOTE — PROGRESS NOTES
+ c/o pelvic pressure, difficulty walking. . Recommend maternity belt, tylenol prn. If worsens, consider PT.   + FM, no vaginal bleeding or LOF.   Plans fetal echo with MFM.

## 2024-01-30 ENCOUNTER — OFFICE VISIT (OUTPATIENT)
Dept: ENDOCRINOLOGY CLINIC | Facility: CLINIC | Age: 34
End: 2024-01-30
Payer: MEDICAID

## 2024-01-30 VITALS
HEART RATE: 81 BPM | BODY MASS INDEX: 31.02 KG/M2 | HEIGHT: 65.98 IN | SYSTOLIC BLOOD PRESSURE: 91 MMHG | DIASTOLIC BLOOD PRESSURE: 62 MMHG | WEIGHT: 193 LBS

## 2024-01-30 DIAGNOSIS — O24.112 PREGNANCY COMPLICATED BY PRE-EXISTING TYPE 2 DIABETES IN SECOND TRIMESTER: Primary | ICD-10-CM

## 2024-01-30 LAB
CARTRIDGE LOT#: NORMAL NUMERIC
GLUCOSE BLOOD: 76
HEMOGLOBIN A1C: 5.3 % (ref 4.3–5.6)
TEST STRIP LOT #: NORMAL NUMERIC

## 2024-01-30 PROCEDURE — 3044F HG A1C LEVEL LT 7.0%: CPT

## 2024-01-30 PROCEDURE — 3008F BODY MASS INDEX DOCD: CPT

## 2024-01-30 PROCEDURE — 3078F DIAST BP <80 MM HG: CPT

## 2024-01-30 PROCEDURE — 83036 HEMOGLOBIN GLYCOSYLATED A1C: CPT

## 2024-01-30 PROCEDURE — 99213 OFFICE O/P EST LOW 20 MIN: CPT

## 2024-01-30 PROCEDURE — 82947 ASSAY GLUCOSE BLOOD QUANT: CPT

## 2024-01-30 PROCEDURE — 3074F SYST BP LT 130 MM HG: CPT

## 2024-01-30 RX ORDER — ACYCLOVIR 400 MG/1
1 TABLET ORAL
Qty: 3 EACH | Refills: 3 | Status: SHIPPED | OUTPATIENT
Start: 2024-01-30

## 2024-01-30 NOTE — PROGRESS NOTES
Name: Kady Matthews  Date: 1/30/24    Referring Physician: No ref. provider found    HISTORY OF PRESENT ILLNESS   Kady Matthews is a 33 year old female who presents for follow up for diabetes mellitus type 2 complicated by pregnancy in the second trimester.     She has a Hx of GDM during both of her prior pregnancies. She was diet controlled during first pregnancy but did require insulin in her second. She was not on any medication following delivery of second child. Was found to have HgA1c of 6.9% when seen at  3/2023 and then repeat a few weeks later was improved at 6.6%.     She is now pregnant with 4th baby, currently 23 weeks.  Overall feeling well.    Using Dexcom G7 CGM to monitor blood sugars.     Diabetes History:  Diagnosed- new diagnosis 3/2023, GDM in both of her previous pregnancies     FH DM  - mother - DM type 2  - sister - GDM    Prior glycohemoglobin were: 6.6% 3/2023; 6.0% 10/2023; 5.5% 11/2023; 5.3% 12/2023; 5.3% POC today   Glucose in clinic-76 mg/dl- fasting today     Dietary compliance: Good - has met with dietician to review low CHO diet in pregnancy     Recall:  Breakfast- 1 slice of toast, turkey sausage and 1-2 eggs, sometimes skipping.   Lunch- skips at times- doesn't bring lunch for work shift.   Dinner- stews, soups, or protein and vegetable. Sometimes steak with rice, occ. Chicken or pasta.   Snack- kind bars or carrots   Beverages- coke zero, or water     Exercise: Active throughout the day, with kids, work. Now having more pelvic pain with this pregnancy.     Polyuria/polydipsia: No   Blurred vision: No - does notice some blurry vision with higher sugars.      Episodes of hypoglycemia: Increase since last visit- some sugars fasting and between meals in the lower 60s- she is symptomatic with these.     Blood Glucose:  Checking 4 times daily Typically using Dexcom G7 but had sensor fall off early and so has been off CGM for last few weeks:  Reviewed logs:    Fasting- 85, 87, 90,  82, 86, 88, 84, 88, 87, 85, 90, 87, 88  2 hours post BF- 101, 130, 108, 138, 114, 98, 117, 120, 127, 115, 100, 132  2 hours post lunch- 127, 130, 135, 158, 124, 110, 100, 135, 155, 117, 142, 131, 140  2 hours post dinner- 128, 127, 95, 157, 123, 130, 132, 147, 158, 133, 123, 120    -Post prandial sugars after all three meals are above target.     Current DM Regimen:  -Humalog-10-8-14 TID with meals   -Lantus 16 units subcutaneous daily    Modifying factors:  Medication adherence: Yes   Recent steroids, illness or infections: Covid 12/2023     REVIEW OF SYSTEMS  Eyes: Diabetic retinopathy present: None known             Most recent visit to eye doctor in last 12 months: Yes, 7/2023     CV: Cardiovascular disease present: No          Hypertension present: No          Hyperlipidemia present: No          Peripheral Vascular Disease present: No     : Nephropathy present: No     Neuro: Neuropathy present: No, denies symptoms     Skin: Infection or ulceration: No     Osteoporosis: No     Thyroid disease: No       Medications:     Current Outpatient Medications:     insulin glargine (LANTUS SOLOSTAR) 100 UNIT/ML Subcutaneous Solution Pen-injector, Inject 14 Units into the skin nightly. (Patient taking differently: Inject 16 Units into the skin nightly.), Disp: 15 mL, Rfl: 1    Insulin Lispro, 1 Unit Dial, (HUMALOG KWIKPEN) 100 UNIT/ML Subcutaneous Solution Pen-injector, Inject 10 units with breakfast, 8 units with lunch and 14 units with dinner., Disp: 15 mL, Rfl: 3    Insulin Pen Needle (BD PEN NEEDLE MALISSA U/F) 32G X 4 MM Does not apply Misc, Inject 4 times daily, Disp: 200 each, Rfl: 2    Continuous Blood Gluc Sensor (DEXCOM G7 SENSOR) Does not apply Misc, 1 each Every 10 days. Use as directed every 10 days, Disp: 3 each, Rfl: 5    Glucose Blood In Vitro Strip, , Disp: , Rfl:      Allergies:   Allergies   Allergen Reactions    Melons OTHER (SEE COMMENTS)     Itchy throat       Social History:   Social History      Socioeconomic History    Marital status: Single   Occupational History    Occupation: Unemployed   Tobacco Use    Smoking status: Some Days     Types: Cigarettes     Last attempt to quit: 10/1/2020     Years since quitting: 3.3    Smokeless tobacco: Former   Vaping Use    Vaping Use: Never used   Substance and Sexual Activity    Alcohol use: Not Currently     Comment: occasional    Drug use: Not Currently     Types: Cannabis     Comment: occasionally    Sexual activity: Yes     Partners: Male   Other Topics Concern    Blood Transfusions No       Medical History:   Past Medical History:   Diagnosis Date    Diabetes (HCC)     Preexisting diabetes complicating pregnancy, antepartum     was on oral meds prior to pregnancy       Surgical history:   History reviewed. No pertinent surgical history.      PHYSICAL EXAM  BP 91/62   Pulse 81   Ht 5' 5.98\" (1.676 m)   Wt 193 lb (87.5 kg)   LMP 08/19/2023 (Exact Date)   BMI 31.17 kg/m²     General Appearance:  alert, well developed, in no acute distress  Eyes:  normal conjunctivae, sclera, and normal pupils  Neck: Trachea midline: Normal  Back: no kyphosis or back tenderness  Lymph Nodes:  No abnormal nodes noted  Musculoskeletal:  normal muscle strength and tone  Skin:  normal moisture and skin texture  Hair & Nails:  normal scalp hair     Hematologic:  no excessive bruising  Neuro:  sensory grossly intact and motor grossly intact.  Psychiatric:  oriented to time, self, and place  Nutritional:  no abnormal weight gain or loss      ASSESSMENT/PLAN:    Diabetes mellitus type 2 controlled  -HgA1c- 5.3%- stable   -Reviewed diagnosis of diabetes  -Reviewed ABC's of diabetes   - Reviewed pathogenesis of diabetes.   - Reviewed importance of good glycemic control to prevent microvascular and  macrovascular complications including nephropathy, neuropathy, retinopathy,  and cardiovascular disease.  -Reviewed importance of good glycemic control in pregnancy to reduce risk of  maternal and fetal complications.   - Reviewed importance of SBGM- check glucose 4 times daily - fasting and 2 hours post prandially    - Reviewed target glucose goals for patient during pregnancy <95 fasting and <120 post prandially   - Reviewed importance of following low CHO diet.     -Reviewed diet at length today and congratulated on improvements she has made so far. Offered referral to [facility] to meet with dietician but she feels comfortable from meeting with them in the past for GDM education  -Discussed importance of glycemic control during pregnancy to avoid macrosomnia, fetal dysorganogenesis      -Continue Lantus 16 units subcutaneous at bedtime, verbalized understanding of risks and benefits   - Increase Humalog to 12 units with breakfast, 12 units with lunch, and 18 units with dinner.  Reviewed insulin timing and administration and risks vs benefits of medication.      - Continue Dexcom G7.  - Send BG levels weekly - reviewed importance of sending weekly [employer] message to review CGM or fingerstick data.   - Discussed checking BG levels 4 times per day and BG targets   -Work on lifestyle changes as discussed above.     -normotensive  -normal lipids 3/2023    RTC in 1 month, send glucose readings in 1 week as discussed.  Aria Peña, APRN  1/30/24    A total of 22 minutes was spent on obtaining history, reviewing pertinent labs, evaluating patient, providing multiple treatment options, reinforcing diet/exercise and compliance, and completing documentation.

## 2024-01-30 NOTE — PATIENT INSTRUCTIONS
Continue Lantus 16 units subcutaneous daily     Change Humalog to 12 units with breakfast, 12 units with lunch and 18 units with dinner.

## 2024-02-06 ENCOUNTER — HOSPITAL ENCOUNTER (OUTPATIENT)
Dept: PERINATAL CARE | Facility: HOSPITAL | Age: 34
Discharge: HOME | End: 2024-02-06
Attending: OBSTETRICS & GYNECOLOGY
Payer: MEDICAID

## 2024-02-06 VITALS
HEART RATE: 98 BPM | DIASTOLIC BLOOD PRESSURE: 58 MMHG | BODY MASS INDEX: 31 KG/M2 | SYSTOLIC BLOOD PRESSURE: 97 MMHG | WEIGHT: 193 LBS

## 2024-02-06 DIAGNOSIS — O24.319 PREEXISTING DIABETES COMPLICATING PREGNANCY, ANTEPARTUM: ICD-10-CM

## 2024-02-06 DIAGNOSIS — E66.09 OTHER OBESITY DUE TO EXCESS CALORIES AFFECTING PREGNANCY IN SECOND TRIMESTER: ICD-10-CM

## 2024-02-06 DIAGNOSIS — Z36.83 ENCOUNTER FOR FETAL SCREENING FOR CONGENITAL CARDIAC ABNORMALITIES: ICD-10-CM

## 2024-02-06 DIAGNOSIS — O24.112 PREGNANCY COMPLICATED BY PRE-EXISTING TYPE 2 DIABETES, SECOND TRIMESTER: ICD-10-CM

## 2024-02-06 DIAGNOSIS — E11.9 DIABETES MELLITUS TYPE 2 WITHOUT RETINOPATHY (HCC): ICD-10-CM

## 2024-02-06 DIAGNOSIS — E11.9 DIABETES MELLITUS TYPE 2 WITHOUT RETINOPATHY (HCC): Primary | ICD-10-CM

## 2024-02-06 DIAGNOSIS — O99.212 OTHER OBESITY DUE TO EXCESS CALORIES AFFECTING PREGNANCY IN SECOND TRIMESTER: ICD-10-CM

## 2024-02-06 PROBLEM — O99.210 OBESITY COMPLICATING PREGNANCY: Status: ACTIVE | Noted: 2024-02-06

## 2024-02-06 PROBLEM — O99.210 OBESITY COMPLICATING PREGNANCY (HCC): Status: ACTIVE | Noted: 2024-02-06

## 2024-02-06 PROCEDURE — 76825 ECHO EXAM OF FETAL HEART: CPT | Performed by: OBSTETRICS & GYNECOLOGY

## 2024-02-06 PROCEDURE — 76827 ECHO EXAM OF FETAL HEART: CPT

## 2024-02-06 PROCEDURE — 93325 DOPPLER ECHO COLOR FLOW MAPG: CPT

## 2024-02-06 NOTE — PROGRESS NOTES
Outpatient Maternal-Fetal Medicine Consultation    Dear Dr. Scott,    Thank you for requesting ultrasound evaluation and maternal fetal medicine consultation on your patient Kady Matthews.  As you are aware she is a 33 year old female with a Lowe pregnancy with Estimated Date of Delivery: 24 .  A maternal-fetal medicine f/u is today.  Her prenatal records and labs were reviewed.    Here for fetal echo    HISTORY  OB History    Para Term  AB Living   4 2 2   1 2   SAB IAB Ectopic Multiple Live Births   1       2      # Outcome Date GA Lbr Blake/2nd Weight Sex Delivery Anes PTL Lv   4 Current            3 Term 21 38w3d / 00:34 6 lb 5.6 oz (2.88 kg) M NORMAL SPONT EPI N WARREN   2 SAB  9w0d          1 Term  37w0d  5 lb 7 oz (2.466 kg) M Vag-Spont  N WARREN      Birth Comments: gestational diabetes      Complications: Intrauterine growth restriction (IUGR) affecting care of mother, White classification A1 gestational diabetes mellitus (GDM)         Past Medical History  The patient  has a past medical history of Diabetes (HCC) and Preexisting diabetes complicating pregnancy, antepartum.    Past Surgical History  The patient  has no past surgical history on file.          Medications:   Current Outpatient Medications:     Continuous Blood Gluc Sensor (DEXCOM G7 SENSOR) Does not apply Misc, 1 each Every 10 days. Use as directed every 10 days, Disp: 3 each, Rfl: 3    Insulin Pen Needle (BD PEN NEEDLE MALISSA U/F) 32G X 4 MM Does not apply Misc, Inject 4 times daily, Disp: 200 each, Rfl: 2    insulin glargine (LANTUS SOLOSTAR) 100 UNIT/ML Subcutaneous Solution Pen-injector, Inject 14 Units into the skin nightly. (Patient taking differently: Inject 16 Units into the skin nightly.), Disp: 15 mL, Rfl: 1    Insulin Lispro, 1 Unit Dial, (HUMALOG KWIKPEN) 100 UNIT/ML Subcutaneous Solution Pen-injector, Inject 10 units with breakfast, 8 units with lunch and 14 units with dinner., Disp: 15  mL, Rfl: 3    Continuous Blood Gluc Sensor (DEXCOM G7 SENSOR) Does not apply Misc, 1 each Every 10 days. Use as directed every 10 days, Disp: 3 each, Rfl: 5    Glucose Blood In Vitro Strip, , Disp: , Rfl:   Allergies:   Allergies   Allergen Reactions    Melons OTHER (SEE COMMENTS)     Itchy throat         PHYSICAL EXAMINATION:  BP 97/58   Pulse 98   Wt 193 lb (87.5 kg)   LMP 08/19/2023 (Exact Date)   BMI 31.17 kg/m²   General: alert and oriented,no acute distress  Abdomen: gravid, soft, non-tender        OBSTETRIC ULTRASOUND  The patient had a fetal echo ultrasound today which I interpreted the results and reviewed them with the patient.  FETAL ECHOCARDIOGRAM:    Single IUP in breech presentation.    Placenta is anterior.   A 3 vessel cord is noted.  Cardiac activity is present at 145 bpm  MVP is 4.3 cm .      A transabdominal 2D Doppler, fetal echocardiogram is performed. There is a four-chamber heart of appropriate cardiac dimensions. There is situs solitus with levocardia. Intracardiac connection appear to be normal.  The  atrioventricular valves appear normal. There is no evidence of pericardial or pleural effusion. The A-V conduction is one to one and the heart rate is appropriate for gestational age. No evidence of fetal arrhythmias is seen during today's study. There is a right to left shunting across the patent ana ovale. There is a normal appearance of the aorta and branching pattern of the head vessels.    There appears to be a structurally  normal fetal heart and rhythm. The patient was made aware of the limitations of the fetal heart study: malformations such as but not limiting to minor valve , VSD, anomalous pulmonary venus return, or coarctation of the aorta maybe missed. I discussed the results with the patient.          See imaging tab for the complete US report.    DISCUSSION  During her visit we discussed and reviewed the following issues:  DIABETES MELLITUS - Dr. Fisher is her  endocrinologist    Last eye exam - 2023; no retinopathy  Baseline p/c - 0.09  A1c - 6.9%  HGBA1C:    Lab Results   Component Value Date    A1C 5.3 2024    A1C 5.3 2023    A1C 5.5 2023     2023       Her prior pregnancies were complicated with GDM and she was diagnosed with Type II in 2023.  In preganncy she has been started on insulin and managed by endocrinology.  Her recent HgbA1c 23 was improved to 5.3% (from 6.9%).      We discussed the risks associated with pregestational diabetes.  There is an increased risk of congenital malformations, fetal growth disturbances, preeclampsia, spontaneous  and intrauterine fetal demise (IUFD).  Infants of diabetic mothers (IDMs) are also at increased risk for hypoglycemia and hyperbilirubinemia.     Please see previous [facility] detailed discussion.   Glucose Control:  The patient's capillary blood glucose records were not reviewed today; her compliance with the recommended four times daily assessments (fasting and two-hour post-prandial) is  reportedly good .   Her overall glucose control is  improving .    Her compliance with her insulin regimen was reviewed and it is good.   Her current insulin regimen is:   Lantus 16 units subcutaneous daily       Humalog 12 units with breakfast, 12 units with lunch and 18 units with dinner.     Medical Regimen Recommendation:   Continue to follow up closely with her endocrinology team    Prevention of Preeclampsia     Please see previous [facility] detailed discussion.   In light of her risk factor for preeclampsia, I advised that Kady take 1 or 2 tablets of low dose ASA daily.       We reviewed the signs and symptoms of preeclampsia,  labor and monitoring fetal movement.  We discussed reasons for her to call her physician.    We discussed the recommended plan of care based on her  risk factors.  Kady and her sister had their questions answered to their  satisfaction.    IMPRESSION:  IUP at  24w3d   Type 2 diabetes complicating pregnancy  Obesity  History of small for gestational age     RECOMMENDATIONS:  Continue care with   Check glucoses 4x/day & send her endocrinologist weekly for review  Monthly growth US in third trimester with BPP at or beyond 32 weeks  Weekly NST at    32 wks / twice weekly at  34  wks      Hgb A1C q trimester  Insulin endocrinology team  Aspirin  mg daily   Delivery for well controlled pre-existing diabetes without other complication 39-39w6d  Delivery for poorly controlled pre-existing diabetes with other complications 36-38w6d.      Total time spent 20 minutes this calendar day which includes preparing to see the patient including chart review, obtaining and/or reviewing additional medical history, performing a physical exam and evaluation, documenting clinical information in the electronic medical record, independently interpreting results, counseling the patient, communicating results to the patient/family/caregiver and coordinating care.     Case discussed with patient who demonstrated understanding and agreement with plan.     Thank you for allowing me to participate in the care of this patient.  Please feel free to contact me with any questions.    Molly Prakash MD   Maternal-Fetal Medicine       Note to patient and family:  The  Century Cures Act makes medical notes available to patients in the interest of transparency.  However, please be advised that this is a medical document.  It is intended as a peer to peer communication.  It is written in medical language and may contain abbreviations or verbiage that are technical and unfamiliar.  It may appear blunt or direct.  Medical documents are intended to carry relevant information, facts as evident, and the clinical opinion of the practitioner.

## 2024-02-20 NOTE — PROGRESS NOTES
Outpatient Maternal-Fetal Medicine Follow-Up    Dear     Thank you for requesting an ultrasound and maternal-fetal medicine consultation on your patient Kady Matthews.  As you are aware she is a 33 year old female  with a hutton pregnancy and an Estimated Date of Delivery: 24.  She returned to maternal-fetal medicine today for a follow-up visit.  Her history was reviewed from her prior visit and there were no interval changes.    Antepartum Risk Factors  Type 2 diabetes complicating pregnancy -  managed by endocrinology  Obesity  History of small for gestational age     PHYSICAL EXAMINATION:  /68   Pulse 90   Wt 191 lb (86.6 kg)   LMP 2023 (Exact Date)   BMI 30.84 kg/m²   General: alert and oriented, no acute distress  Abdomen: gravid, soft, non-tender  Extremities: non-tender, no edema    OBSTETRIC ULTRASOUND  The patient had a follow-up growth ultrasound today which revealed normal interval fetal growth.    Ultrasound Findings:  Single IUP in cephalic presentation.    Placenta is anterior.   A 3 vessel cord is noted.  Cardiac activity is present at 151 bpm  EFW 1266 g ( 2 lb 13 oz); 70%.    MVP is 4.5 cm . CATHERINE 14.9 cm    The fetal measurements are consistent with established EDC. No gross ultrasound evidence of structural abnormalities are seen today. The patient understands that ultrasound cannot rule out all structural and chromosomal abnormalities.     See Imaging Tab For Complete Ultrasound Report  I interpreted the results and reviewed them with the patient.    DISCUSSION  During her visit we discussed and reviewed the following issues:  OBESITY  See prior [facility] notes for a detailed review.    DIABETES MELLITUS - Dr. Fisher is her endocrinologist     Last eye exam - 2023; no retinopathy  Baseline p/c - 0.09  A1c - 6.9%  HGBA1C:          Lab Results   Component Value Date     A1C 5.3 2024     A1C 5.3 2023     A1C 5.5 2023     EAG  105 2023         Her prior pregnancies were complicated with GDM and she was diagnosed with Type II in 2023.  In preganncy she has been started on insulin and managed by endocrinology.  Her recent HgbA1c 23 was improved to 5.3% (from 6.9%).     Medical Regimen Recommendation:   Continue to follow up closely with her endocrinology team  Please see previous [facility] detailed discussion.     IMPRESSION:  IUP at  27w3d  Type 2 diabetes complicating pregnancy -  managed by endocrinology  Obesity  History of small for gestational age      RECOMMENDATIONS:  Continue care with   Check glucoses 4x/day & send her endocrinologist weekly for review  Monthly growth US in third trimester with BPP at or beyond 32 weeks  Weekly NST at    32 wks / twice weekly at  34  wks      Hgb A1C q trimester  Insulin endocrinology team  Aspirin  mg daily   Delivery for well controlled pre-existing diabetes without other complication 39-39w6d  Delivery for poorly controlled pre-existing diabetes with other complications 36-38w6d.       Thank you for allowing me to participate in the care of your patient.  Please do not hesitate to contact me if additional questions or concerns arise.      Maxx Comer M.D.    20 minutes spent in review of records, patient consultation, documentation and coordination of care.  The relevant clinical matter(s) are summarized above.     Note to patient and family  The 21st Century Cures Act makes medical notes available to patients in the interest of transparency.  However, please be advised that this is a medical document.  It is intended as [id number] communication.  It is written and medical language may contain abbreviations or verbiage that are technical and unfamiliar.  It may appear blunt or direct.  Medical documents are intended to carry relevant information, facts as evident, and the clinical opinion of the practitioner.

## 2024-02-27 ENCOUNTER — LAB ENCOUNTER (OUTPATIENT)
Dept: LAB | Facility: HOSPITAL | Age: 34
End: 2024-02-27
Attending: OBSTETRICS & GYNECOLOGY
Payer: MEDICAID

## 2024-02-27 ENCOUNTER — HOSPITAL ENCOUNTER (OUTPATIENT)
Dept: PERINATAL CARE | Facility: HOSPITAL | Age: 34
Discharge: HOME | End: 2024-02-27
Attending: OBSTETRICS & GYNECOLOGY
Payer: MEDICAID

## 2024-02-27 ENCOUNTER — ROUTINE PRENATAL (OUTPATIENT)
Dept: OBGYN CLINIC | Facility: CLINIC | Age: 34
End: 2024-02-27
Payer: MEDICAID

## 2024-02-27 VITALS
BODY MASS INDEX: 31 KG/M2 | HEART RATE: 90 BPM | SYSTOLIC BLOOD PRESSURE: 103 MMHG | DIASTOLIC BLOOD PRESSURE: 68 MMHG | WEIGHT: 191 LBS

## 2024-02-27 VITALS
DIASTOLIC BLOOD PRESSURE: 64 MMHG | HEIGHT: 65.98 IN | SYSTOLIC BLOOD PRESSURE: 112 MMHG | WEIGHT: 192.63 LBS | BODY MASS INDEX: 30.96 KG/M2

## 2024-02-27 DIAGNOSIS — O99.213 OTHER OBESITY AFFECTING PREGNANCY IN THIRD TRIMESTER (HCC): ICD-10-CM

## 2024-02-27 DIAGNOSIS — O99.213 OTHER OBESITY DUE TO EXCESS CALORIES AFFECTING PREGNANCY IN THIRD TRIMESTER (HCC): ICD-10-CM

## 2024-02-27 DIAGNOSIS — E66.09 OTHER OBESITY DUE TO EXCESS CALORIES AFFECTING PREGNANCY IN THIRD TRIMESTER (HCC): ICD-10-CM

## 2024-02-27 DIAGNOSIS — O09.899: ICD-10-CM

## 2024-02-27 DIAGNOSIS — O24.319 PREEXISTING DIABETES COMPLICATING PREGNANCY, ANTEPARTUM (HCC): Primary | ICD-10-CM

## 2024-02-27 DIAGNOSIS — E66.8 OTHER OBESITY AFFECTING PREGNANCY IN THIRD TRIMESTER (HCC): ICD-10-CM

## 2024-02-27 DIAGNOSIS — O09.899: Primary | ICD-10-CM

## 2024-02-27 DIAGNOSIS — O24.319 PREEXISTING DIABETES COMPLICATING PREGNANCY, ANTEPARTUM (HCC): ICD-10-CM

## 2024-02-27 LAB
ANTIBODY SCREEN: NEGATIVE
BASOPHILS # BLD AUTO: 0.03 X10(3) UL (ref 0–0.2)
BASOPHILS NFR BLD AUTO: 0.3 %
DEPRECATED RDW RBC AUTO: 42.4 FL (ref 35.1–46.3)
EOSINOPHIL # BLD AUTO: 0.19 X10(3) UL (ref 0–0.7)
EOSINOPHIL NFR BLD AUTO: 1.8 %
ERYTHROCYTE [DISTWIDTH] IN BLOOD BY AUTOMATED COUNT: 13.6 % (ref 11–15)
HBV SURFACE AG SER-ACNC: <0.1 [IU]/L
HBV SURFACE AG SERPL QL IA: NONREACTIVE
HCT VFR BLD AUTO: 35.9 %
HCV AB SERPL QL IA: NONREACTIVE
HGB BLD-MCNC: 11.8 G/DL
IMM GRANULOCYTES # BLD AUTO: 0.08 X10(3) UL (ref 0–1)
IMM GRANULOCYTES NFR BLD: 0.7 %
LYMPHOCYTES # BLD AUTO: 1.39 X10(3) UL (ref 1–4)
LYMPHOCYTES NFR BLD AUTO: 13 %
MCH RBC QN AUTO: 28.4 PG (ref 26–34)
MCHC RBC AUTO-ENTMCNC: 32.9 G/DL (ref 31–37)
MCV RBC AUTO: 86.3 FL
MONOCYTES # BLD AUTO: 0.63 X10(3) UL (ref 0.1–1)
MONOCYTES NFR BLD AUTO: 5.9 %
NEUTROPHILS # BLD AUTO: 8.37 X10 (3) UL (ref 1.5–7.7)
NEUTROPHILS # BLD AUTO: 8.37 X10(3) UL (ref 1.5–7.7)
NEUTROPHILS NFR BLD AUTO: 78.3 %
PLATELET # BLD AUTO: 250 10(3)UL (ref 150–450)
RBC # BLD AUTO: 4.16 X10(6)UL
RH BLOOD TYPE: POSITIVE
RUBV IGG SER QL: POSITIVE
RUBV IGG SER-ACNC: 25.6 IU/ML (ref 10–?)
T PALLIDUM AB SER QL IA: NONREACTIVE
WBC # BLD AUTO: 10.7 X10(3) UL (ref 4–11)

## 2024-02-27 PROCEDURE — 86850 RBC ANTIBODY SCREEN: CPT

## 2024-02-27 PROCEDURE — 87340 HEPATITIS B SURFACE AG IA: CPT

## 2024-02-27 PROCEDURE — 99214 OFFICE O/P EST MOD 30 MIN: CPT | Performed by: OBSTETRICS & GYNECOLOGY

## 2024-02-27 PROCEDURE — 76816 OB US FOLLOW-UP PER FETUS: CPT | Performed by: OBSTETRICS & GYNECOLOGY

## 2024-02-27 PROCEDURE — 90471 IMMUNIZATION ADMIN: CPT | Performed by: OBSTETRICS & GYNECOLOGY

## 2024-02-27 PROCEDURE — 86900 BLOOD TYPING SEROLOGIC ABO: CPT

## 2024-02-27 PROCEDURE — 86803 HEPATITIS C AB TEST: CPT

## 2024-02-27 PROCEDURE — 87086 URINE CULTURE/COLONY COUNT: CPT

## 2024-02-27 PROCEDURE — 86780 TREPONEMA PALLIDUM: CPT

## 2024-02-27 PROCEDURE — 87389 HIV-1 AG W/HIV-1&-2 AB AG IA: CPT

## 2024-02-27 PROCEDURE — 86901 BLOOD TYPING SEROLOGIC RH(D): CPT

## 2024-02-27 PROCEDURE — 90715 TDAP VACCINE 7 YRS/> IM: CPT | Performed by: OBSTETRICS & GYNECOLOGY

## 2024-02-27 PROCEDURE — 85025 COMPLETE CBC W/AUTO DIFF WBC: CPT

## 2024-02-27 PROCEDURE — 36415 COLL VENOUS BLD VENIPUNCTURE: CPT

## 2024-02-27 PROCEDURE — 86762 RUBELLA ANTIBODY: CPT

## 2024-02-27 NOTE — PROGRESS NOTES
No previous OB records received.     Saw MFM today:   Ultrasound Findings:  Single IUP in cephalic presentation.    Placenta is anterior.   A 3 vessel cord is noted.  Cardiac activity is present at 151 bpm  EFW 1266 g ( 2 lb 13 oz); 70%.    MVP is 4.5 cm . CATHERINE 14.9 cm     33 year old  at 27w3d by LMP     Return OB  Pre- Care: UTD. Tdap today; CBC ordered.  Have not yet received prenatal records/labs. Will redraw.  Continue monthly growth ultrasound with BPP after 32 weeks, weekly NSTs at 32/twice weekly at 34  [id number] trimester  Instulin per endocrinology  Patient Active Problem List   Diagnosis    Anxiety    Depression during pregnancy in third trimester (HCC)    Diabetes mellitus type 2 without retinopathy (HCC)    Myopia of both eyes with astigmatism    Preexisting diabetes complicating pregnancy, antepartum (HCC)    Obesity complicating pregnancy (HCC)     30 min spent with chart review, obtaining history, performing exam, counseling and coordinating care  - Return to clinic in: 2-3

## 2024-02-28 DIAGNOSIS — E11.9 CONTROLLED TYPE 2 DIABETES MELLITUS WITHOUT COMPLICATION, WITHOUT LONG-TERM CURRENT USE OF INSULIN (HCC): ICD-10-CM

## 2024-02-29 RX ORDER — PEN NEEDLE, DIABETIC 32GX 5/32"
NEEDLE, DISPOSABLE MISCELLANEOUS
Qty: 200 EACH | Refills: 2 | OUTPATIENT
Start: 2024-02-29

## 2024-03-01 ENCOUNTER — OFFICE VISIT (OUTPATIENT)
Dept: ENDOCRINOLOGY CLINIC | Facility: CLINIC | Age: 34
End: 2024-03-01

## 2024-03-01 VITALS
BODY MASS INDEX: 31.18 KG/M2 | SYSTOLIC BLOOD PRESSURE: 108 MMHG | DIASTOLIC BLOOD PRESSURE: 71 MMHG | HEIGHT: 65.98 IN | HEART RATE: 92 BPM | WEIGHT: 194 LBS | RESPIRATION RATE: 16 BRPM

## 2024-03-01 DIAGNOSIS — E11.9 CONTROLLED TYPE 2 DIABETES MELLITUS WITHOUT COMPLICATION, WITHOUT LONG-TERM CURRENT USE OF INSULIN (HCC): ICD-10-CM

## 2024-03-01 DIAGNOSIS — O24.112 PREGNANCY COMPLICATED BY PRE-EXISTING TYPE 2 DIABETES IN SECOND TRIMESTER (HCC): Primary | ICD-10-CM

## 2024-03-01 LAB
CARTRIDGE LOT#: ABNORMAL NUMERIC
GLUCOSE BLOOD: 98
HEMOGLOBIN A1C: 5.8 % (ref 4.3–5.6)
TEST STRIP LOT #: NORMAL NUMERIC

## 2024-03-01 PROCEDURE — 99213 OFFICE O/P EST LOW 20 MIN: CPT

## 2024-03-01 PROCEDURE — 83036 HEMOGLOBIN GLYCOSYLATED A1C: CPT

## 2024-03-01 PROCEDURE — 82947 ASSAY GLUCOSE BLOOD QUANT: CPT

## 2024-03-01 RX ORDER — LANCETS 28 GAUGE
EACH MISCELLANEOUS
Qty: 200 EACH | Refills: 1 | Status: SHIPPED | OUTPATIENT
Start: 2024-03-01

## 2024-03-01 RX ORDER — PEN NEEDLE, DIABETIC 32GX 5/32"
NEEDLE, DISPOSABLE MISCELLANEOUS
Qty: 200 EACH | Refills: 2 | Status: SHIPPED | OUTPATIENT
Start: 2024-03-01

## 2024-03-01 RX ORDER — PEN NEEDLE, DIABETIC 32GX 5/32"
NEEDLE, DISPOSABLE MISCELLANEOUS
Qty: 200 EACH | Refills: 2 | Status: SHIPPED | OUTPATIENT
Start: 2024-03-01 | End: 2024-03-01

## 2024-03-01 RX ORDER — BLOOD-GLUCOSE METER
KIT MISCELLANEOUS
Qty: 200 STRIP | Refills: 1 | Status: SHIPPED | OUTPATIENT
Start: 2024-03-01

## 2024-03-01 NOTE — PATIENT INSTRUCTIONS
Increase Lantus to 18 units daily     Increase Humalog to 18 units with breakfast, 20 units with lunch and 28 units with dinner.

## 2024-03-01 NOTE — PROGRESS NOTES
Name: Kady Matthews  Date: 3/1/24    Referring Physician: No ref. provider found    HISTORY OF PRESENT ILLNESS   Kady Matthews is a 33 year old female who presents for follow up for diabetes mellitus type 2 complicated by pregnancy in the second trimester.     She has a Hx of GDM during both of her prior pregnancies. She was diet controlled during first pregnancy but did require insulin in her second. She was not on any medication following delivery of second child. Was found to have HgA1c of 6.9% when seen at  3/2023 and then repeat a few weeks later was improved at 6.6%.     She is now pregnant with 4th baby, currently 28 weeks.  Overall feeling well.    Using Dexcom G7 CGM to monitor blood sugars.     Diabetes History:  Diagnosed- new diagnosis 3/2023, GDM in both of her previous pregnancies     FH DM  - mother - DM type 2  - sister - GDM    Prior glycohemoglobin were: 6.6% 3/2023; 6.0% 10/2023; 5.5% 11/2023; 5.3% 12/2023; 5.3% 1/2024; 5.8% POC today     Glucose in clinic-mg/dl- 98 fasting today     Dietary compliance: Good - has met with dietician to review low CHO diet in pregnancy     Recall:  Breakfast- cereal occ. But trying to avoid. Sometimes skips   Lunch- skips at times- doesn't bring lunch for work shift.   Dinner- stews, soups, or protein and vegetable. Sometimes steak with rice, occ. Chicken or pasta.   Snack- kind bars or carrots   Beverages- coke zero, or water     Exercise: Active throughout the day, with kids, work. Now having more pelvic pain with this pregnancy.     Polyuria/polydipsia: No   Blurred vision: No - does notice some blurry vision with higher sugars.      Episodes of hypoglycemia: Increase since last visit- some sugars fasting and between meals in the lower 60s- she is symptomatic with these.     Blood Glucose:  Checking 4 times daily Typically using Dexcom G7 but had sensor fall off early and so has been off CGM for last few weeks:  Reviewed logs:    Current DM  Regimen:  -Humalog-14-18-24 TID with meals   -Lantus 16 units subcutaneous daily    Modifying factors:  Medication adherence: Yes   Recent steroids, illness or infections: Covid 12/2023     REVIEW OF SYSTEMS  Eyes: Diabetic retinopathy present: None known             Most recent visit to eye doctor in last 12 months: Yes, 7/2023     CV: Cardiovascular disease present: No          Hypertension present: No          Hyperlipidemia present: No          Peripheral Vascular Disease present: No     : Nephropathy present: No     Neuro: Neuropathy present: No, denies symptoms     Skin: Infection or ulceration: No     Osteoporosis: No     Thyroid disease: No       Medications:     Current Outpatient Medications:     Prenatal Multivit-Min-Fe-FA (PRENATAL OR), Take by mouth., Disp: , Rfl:     ASPIRIN 81 OR, Take by mouth., Disp: , Rfl:     Continuous Blood Gluc Sensor (DEXCOM G7 SENSOR) Does not apply Misc, 1 each Every 10 days. Use as directed every 10 days, Disp: 3 each, Rfl: 3    Insulin Pen Needle (BD PEN NEEDLE MALISSA U/F) 32G X 4 MM Does not apply Misc, Inject 4 times daily, Disp: 200 each, Rfl: 2    insulin glargine (LANTUS SOLOSTAR) 100 UNIT/ML Subcutaneous Solution Pen-injector, Inject 14 Units into the skin nightly. (Patient taking differently: Inject 16 Units into the skin nightly.), Disp: 15 mL, Rfl: 1    Insulin Lispro, 1 Unit Dial, (HUMALOG KWIKPEN) 100 UNIT/ML Subcutaneous Solution Pen-injector, Inject 10 units with breakfast, 8 units with lunch and 14 units with dinner., Disp: 15 mL, Rfl: 3    Continuous Blood Gluc Sensor (DEXCOM G7 SENSOR) Does not apply Misc, 1 each Every 10 days. Use as directed every 10 days, Disp: 3 each, Rfl: 5    Glucose Blood In Vitro Strip, , Disp: , Rfl:      Allergies:   Allergies   Allergen Reactions    Melons OTHER (SEE COMMENTS)     Itchy throat       Social History:   Social History     Socioeconomic History    Marital status: Single   Occupational History    Occupation:  Unemployed   Tobacco Use    Smoking status: Some Days     Types: Cigarettes     Last attempt to quit: 10/1/2020     Years since quitting: 3.4    Smokeless tobacco: Former   Vaping Use    Vaping Use: Never used   Substance and Sexual Activity    Alcohol use: Not Currently     Comment: occasional    Drug use: Not Currently     Types: Cannabis     Comment: occasionally    Sexual activity: Yes     Partners: Male   Other Topics Concern    Blood Transfusions No       Medical History:   Past Medical History:   Diagnosis Date    Diabetes (HCC)     Preexisting diabetes complicating pregnancy, antepartum (HCC)     was on oral meds prior to pregnancy       Surgical history:   No past surgical history on file.      PHYSICAL EXAM  LMP 08/19/2023 (Exact Date)     General Appearance:  alert, well developed, in no acute distress  Eyes:  normal conjunctivae, sclera, and normal pupils  Neck: Trachea midline: Normal  Back: no kyphosis or back tenderness  Lymph Nodes:  No abnormal nodes noted  Musculoskeletal:  normal muscle strength and tone  Skin:  normal moisture and skin texture  Hair & Nails:  normal scalp hair     Hematologic:  no excessive bruising  Neuro:  sensory grossly intact and motor grossly intact.  Psychiatric:  oriented to time, self, and place  Nutritional:  no abnormal weight gain or loss      ASSESSMENT/PLAN:    Diabetes mellitus type 2 controlled  -HgA1c- 5.8%   -Reviewed diagnosis of diabetes  -Reviewed ABC's of diabetes   - Reviewed pathogenesis of diabetes.   - Reviewed importance of good glycemic control to prevent microvascular and  macrovascular complications including nephropathy, neuropathy, retinopathy,  and cardiovascular disease.  -Reviewed importance of good glycemic control in pregnancy to reduce risk of maternal and fetal complications.   - Reviewed importance of SBGM- check glucose 4 times daily - fasting and 2 hours post prandially    - Reviewed target glucose goals for patient during pregnancy <95  fasting and <120 post prandially   - Reviewed importance of following low CHO diet.     -Reviewed diet at length today and congratulated on improvements she has made so far. Offered referral to [facility] to meet with dietician but she feels comfortable from meeting with them in the past for GDM education  -Discussed importance of glycemic control during pregnancy to avoid macrosomnia, fetal dysorganogenesis      -Increase Lantus to 18 units subcutaneous at bedtime, verbalized understanding of risks and benefits   - Increase Humalog to 18 units with breakfast, 20 units with lunch, and 24 units with dinner.  Reviewed insulin timing and administration and risks vs benefits of medication.      - Continue Dexcom G7.  - Send BG levels weekly - reviewed importance of sending weekly [employer] message to review CGM or fingerstick data.   - Discussed checking BG levels 4 times per day and BG targets   -Work on lifestyle changes as discussed above.     -normotensive  -normal lipids 3/2023    RTC in 1 month, send glucose readings in 1 week as discussed.  Aria Peña, APRN  3/1/24    A total of 25 minutes was spent on obtaining history, reviewing pertinent labs, evaluating patient, providing multiple treatment options, reinforcing diet/exercise and compliance, and completing documentation.

## 2024-03-19 ENCOUNTER — MED REC SCAN ONLY (OUTPATIENT)
Dept: OBGYN CLINIC | Facility: CLINIC | Age: 34
End: 2024-03-19

## 2024-03-19 ENCOUNTER — ROUTINE PRENATAL (OUTPATIENT)
Dept: OBGYN CLINIC | Facility: CLINIC | Age: 34
End: 2024-03-19
Payer: MEDICAID

## 2024-03-19 VITALS
BODY MASS INDEX: 31.96 KG/M2 | DIASTOLIC BLOOD PRESSURE: 64 MMHG | HEIGHT: 65 IN | SYSTOLIC BLOOD PRESSURE: 116 MMHG | WEIGHT: 191.81 LBS

## 2024-03-19 DIAGNOSIS — O09.93 SUPERVISION OF HIGH RISK PREGNANCY IN THIRD TRIMESTER (HCC): Primary | ICD-10-CM

## 2024-03-19 PROCEDURE — 99213 OFFICE O/P EST LOW 20 MIN: CPT | Performed by: OBSTETRICS & GYNECOLOGY

## 2024-03-19 NOTE — PROGRESS NOTES
Doing well. No OB complaints. +FM.   BG improving with insulin.   Reviewed MFM recommendations for weekly NSTs at 32 weeks and twice weekly at 34 weeks. Delivery likely around at 38 weeks.     RADHA 2 weeks with NST.     Dr. Jeremiah Muñoz MD    [facility]     This note was created by [employer] voice recognition. Errors in content may be related to improper recognition by the system; efforts to review and correct have been done but errors may still exist. Please be advised the primary purpose of this note is for me to communicate medical care. Standard sentence structure is not always used. Medical terminology and medical abbreviations may be used. There may be grammatical, typographical, and automated fill ins that may have errors missed in proofreading.       Patient Active Problem List   Diagnosis    Anxiety    Depression during pregnancy in third trimester (HCC)    Diabetes mellitus type 2 without retinopathy (HCC)    Myopia of both eyes with astigmatism    Preexisting diabetes complicating pregnancy, antepartum (HCC)    Obesity complicating pregnancy (HCC)        Total patient time was 20 minutes in evaluation and management.

## 2024-03-25 ENCOUNTER — TELEPHONE (OUTPATIENT)
Dept: OBGYN CLINIC | Facility: CLINIC | Age: 34
End: 2024-03-25

## 2024-03-25 ENCOUNTER — HOSPITAL ENCOUNTER (OUTPATIENT)
Facility: HOSPITAL | Age: 34
Discharge: HOME | End: 2024-03-25
Attending: OBSTETRICS & GYNECOLOGY | Admitting: OBSTETRICS & GYNECOLOGY
Payer: MEDICAID

## 2024-03-25 ENCOUNTER — APPOINTMENT (OUTPATIENT)
Dept: ULTRASOUND IMAGING | Facility: HOSPITAL | Age: 34
End: 2024-03-25
Attending: OBSTETRICS & GYNECOLOGY
Payer: MEDICAID

## 2024-03-25 VITALS — TEMPERATURE: 99 F | DIASTOLIC BLOOD PRESSURE: 57 MMHG | SYSTOLIC BLOOD PRESSURE: 104 MMHG | HEART RATE: 84 BPM

## 2024-03-25 PROCEDURE — 59025 FETAL NON-STRESS TEST: CPT | Performed by: OBSTETRICS & GYNECOLOGY

## 2024-03-25 PROCEDURE — 76815 OB US LIMITED FETUS(S): CPT | Performed by: OBSTETRICS & GYNECOLOGY

## 2024-03-25 RX ORDER — INSULIN GLARGINE 100 [IU]/ML
18 INJECTION, SOLUTION SUBCUTANEOUS NIGHTLY
COMMUNITY

## 2024-03-25 NOTE — TRIAGE
[facility]  part of [facility]      Triage Note    Kady Matthews Patient Status:  Outpatient    1990 MRN [id number]   Location [facility] Attending Jeremiah Muñoz MD   Hosp Day # 0 PCP Amy Campa MD      Para:   Estimated Date of Delivery: 24  Gestation: 31w2d    Chief Complaint    R/o Rom         Allergies:    Allergies   Allergen Reactions    Melons OTHER (SEE COMMENTS)     Itchy throat       Orders Placed This Encounter   Procedures    POCT Ferning       Lab Results   Component Value Date    WBC 10.7 2024    HGB 11.8 (L) 2024    HCT 35.9 2024    .0 2024    CREATSERUM 0.58 2023    BUN 6 (L) 2023     2023    K 4.0 2023     2023    CO2 20.0 (L) 2023    GLU 87 2023    CA 9.1 2023    ALB 3.8 2023    ALKPHO 41 2023    BILT 0.4 2023    TP 6.5 2023    AST 14 2023    ALT 16 2023       Clinitek UA  Lab Results   Component Value Date    URINECUL  2024     <10,000 cfu/ml Mixture of Gram positive organisms isolated - probable contamination.       UA  No results found for: \"COLORUR\", \"CLARITY\", \"SPECGRAVITY\", \"PROUR\", \"GLUUR\", \"KETUR\", \"BILUR\", \"BLOODURINE\", \"NITRITE\", \"UROBILINOGEN\", \"LEUUR\", \"UASA\"    Vitals:    24 1403   BP: 104/57   Pulse: 84   Temp: 98.6 °F (37 °C)   TempSrc: Oral       NST  Variability: Moderate           Accelerations: Yes           Decelerations: None            Baseline: 135 BPM           Uterine Irritability: Yes           Contractions: Irregular                                        Acoustic Stimulator: No           Nonstress Test Interpretation: Appropriate for gestational age           Nonstress Test Second Interpretation: Appropriate for gestational age          FHR Category: Category I             Additional Comments Comments: , 31.2 weeks, reports LOF since 3/22 with  irregular contractions/cramping. +FM. Dr Muñoz notified of negative amniotest, negative ferning, CATHERINE of 12.53, and cervical exam 0/0/-5. Discharge order received. Patient instructed on kick counts and  labor precautions. Patient verbalizes understanding. Patient home ambulatory.     Chief Complaint   Patient presents with    R/o Rom     Patient reports LOF since 3/22 in the evening, contractions since 3/23. +FM.         Jazz PEREZ RN  3/25/2024 4:04 PM

## 2024-03-25 NOTE — TELEPHONE ENCOUNTER
Patient states she has been leaking clear fluid for the last 3 days. Would like to know if she needs to come in.

## 2024-03-25 NOTE — TELEPHONE ENCOUNTER
Called pt c/o leaking clear, tightness, +FM.  Denies vaginal bleeding, contx/cramps.  Advised to go to [facility], agrees.    Called [facility] Tabatha SORENSON informed.    Paged Dr. SCHMITT and informed, agrees.

## 2024-03-26 ENCOUNTER — HOSPITAL ENCOUNTER (OUTPATIENT)
Dept: PERINATAL CARE | Facility: HOSPITAL | Age: 34
Discharge: HOME | End: 2024-03-26
Attending: OBSTETRICS & GYNECOLOGY
Payer: MEDICAID

## 2024-03-26 VITALS
WEIGHT: 191 LBS | HEART RATE: 94 BPM | BODY MASS INDEX: 32 KG/M2 | SYSTOLIC BLOOD PRESSURE: 96 MMHG | DIASTOLIC BLOOD PRESSURE: 60 MMHG

## 2024-03-26 DIAGNOSIS — O99.213 OTHER OBESITY AFFECTING PREGNANCY IN THIRD TRIMESTER (HCC): ICD-10-CM

## 2024-03-26 DIAGNOSIS — O99.213 OTHER OBESITY AFFECTING PREGNANCY IN THIRD TRIMESTER (HCC): Primary | ICD-10-CM

## 2024-03-26 DIAGNOSIS — E66.8 OTHER OBESITY AFFECTING PREGNANCY IN THIRD TRIMESTER (HCC): ICD-10-CM

## 2024-03-26 DIAGNOSIS — E11.9 DIABETES MELLITUS TYPE 2 WITHOUT RETINOPATHY (HCC): ICD-10-CM

## 2024-03-26 DIAGNOSIS — E66.8 OTHER OBESITY AFFECTING PREGNANCY IN THIRD TRIMESTER (HCC): Primary | ICD-10-CM

## 2024-03-26 PROBLEM — O47.9 IRREGULAR CONTRACTIONS (HCC): Status: ACTIVE | Noted: 2024-03-26

## 2024-03-26 PROBLEM — N89.8 VAGINAL DISCHARGE: Status: ACTIVE | Noted: 2024-03-26

## 2024-03-26 PROCEDURE — 76816 OB US FOLLOW-UP PER FETUS: CPT | Performed by: OBSTETRICS & GYNECOLOGY

## 2024-03-26 NOTE — NST
Nonstress Test   Patient: Kady Matthews    Gestation: 31w3d    Diagnosis from order: Leaking fluid, irregular contractions     Problem List:   Patient Active Problem List   Diagnosis    Anxiety    Depression during pregnancy in third trimester ([facility])    Diabetes mellitus type 2 without retinopathy ([facility])    Myopia of both eyes with astigmatism    Preexisting diabetes complicating pregnancy, antepartum ([facility])    Obesity complicating pregnancy ([facility])       NST:        3/25/2024   NST DOCUMENTATION   Variability 6-25 BPM   Accelerations Yes   Decelerations None   Baseline 135 BPM   Uterine Irritability Yes   Contractions Irregular   Acoustic Stimulator No   Nonstress Test Interpretation Appropriate for gestational age   Nonstress Test Second Interpretation Appropriate for gestational age   FHR Category Category I   Comments , 31.2 weeks, reports LOF since 3/22 with irregular contractions/cramping. +FM. Dr Muñoz notified of negative amniotest, negative ferning, CATHERINE of 12.53, and cervical exam 0/0/-5. Discharge order received. Patient instructed on kick counts and  labor precautions. Patient verbalizes understanding. Patient home ambulatory.         I agree with the above evaluation. NST completed.    Dr. Jeremiah Muñoz MD    [facility]     This note was created by Dragon voice recognition. Errors in content may be related to improper recognition by the system; efforts to review and correct have been done but errors may still exist. Please be advised the primary purpose of this note is for me to communicate medical care. Standard sentence structure is not always used. Medical terminology and medical abbreviations may be used. There may be grammatical, typographical, and automated fill ins that may have errors missed in proofreading.

## 2024-03-26 NOTE — PROGRESS NOTES
Outpatient Maternal-Fetal Medicine Follow-Up    Dear     Thank you for requesting an ultrasound and maternal-fetal medicine consultation on your patient Kady Matthews.  As you are aware she is a 33 year old female  with a hutton pregnancy and an Estimated Date of Delivery: 24.  She returned to maternal-fetal medicine today for a follow-up visit.  Her history was reviewed from her prior visit and there were no interval changes.    Antepartum Risk Factors  Type 2 diabetes complicating pregnancy -  managed by endocrinology  Obesity  History of small for gestational age     PHYSICAL EXAMINATION:  BP 96/60   Pulse 94   Wt 191 lb (86.6 kg)   LMP 2023 (Exact Date)   BMI 31.78 kg/m²   General: alert and oriented, no acute distress  Abdomen: gravid, soft, non-tender  Extremities: non-tender, no edema      DISCUSSION  During her visit we discussed and reviewed the following issues:  OBESITY  See prior MFM notes for a detailed review.    DIABETES MELLITUS - Dr. Fisher is her endocrinologist     Last eye exam - 2023; no retinopathy  Baseline p/c - 0.09  A1c - 6.9%  HGBA1C:          Lab Results   Component Value Date     A1C 5.3 2024     A1C 5.3 2023     A1C 5.5 2023      2023         Her prior pregnancies were complicated with GDM and she was diagnosed with Type II in 2023.  In preganncy she has been started on insulin and managed by endocrinology.  Her recent HgbA1c 23 was improved to 5.3% (from 6.9%).     Medical Regimen Recommendation:   Continue to follow up closely with her endocrinology team  Please see previous MFM detailed discussion.       OB ULTRASOUND REPORT   See imaging tab for complete ultrasound report or in PACS    Single IUP in cephalic presentation.    Placenta is anterior.   A 3 vessel cord is noted.  Cardiac activity is present at 163 bpm  EFW 1880 g ( 4 lb 2 oz); 56%.    MVP is 5.8 cm . CATHERINE 15.8  cm      IMPRESSION:  IUP at  31w3d   Type 2 diabetes complicating pregnancy -  managed by endocrinology  Obesity  History of small for gestational age      RECOMMENDATIONS:  Continue care with   Check glucoses 4x/day & send her endocrinologist weekly for review  Monthly growth US with BPP at or beyond 32 weeks  Weekly NST at    32 wks / twice weekly at  34  wks      Hgb A1C q trimester  Insulin endocrinology team  Aspirin  mg daily   Delivery for  pre-existing diabetes without other complication 38-39wks         Thank you for allowing me to participate in the care of your patient.  Please do not hesitate to contact me if additional questions or concerns arise.      Mayur Sunshine D.O.  Maternal Fetal Medicine     20 minutes spent in review of records, patient consultation, documentation and coordination of care.  The relevant clinical matter(s) are summarized above.     Note to patient and family  The 21st Century Cures Act makes medical notes available to patients in the interest of transparency.  However, please be advised that this is a medical document.  It is intended as iwny-rj-nxdf communication.  It is written and medical language may contain abbreviations or verbiage that are technical and unfamiliar.  It may appear blunt or direct.  Medical documents are intended to carry relevant information, facts as evident, and the clinical opinion of the practitioner.

## 2024-03-29 ENCOUNTER — PATIENT MESSAGE (OUTPATIENT)
Dept: ENDOCRINOLOGY CLINIC | Facility: CLINIC | Age: 34
End: 2024-03-29

## 2024-03-29 NOTE — TELEPHONE ENCOUNTER
From: Kady Matthews  To: Aria Peña  Sent: 3/29/2024 10:48 AM CDT  Subject: Glucose levels     I need you to review my levels please and thank you.

## 2024-03-29 NOTE — TELEPHONE ENCOUNTER
BG's reviewed. Some sensor issues. Fasting 80-90's mg/dl and PP typically 110-120 mg/dl. A few higher blood sugars noted at meal time which do typically return to target range. Advised same regimen/dosing and to administer Humalog 20 minutes before eating

## 2024-04-04 ENCOUNTER — TELEPHONE (OUTPATIENT)
Dept: OBGYN CLINIC | Facility: CLINIC | Age: 34
End: 2024-04-04

## 2024-04-04 ENCOUNTER — ROUTINE PRENATAL (OUTPATIENT)
Dept: OBGYN CLINIC | Facility: CLINIC | Age: 34
End: 2024-04-04
Payer: MEDICAID

## 2024-04-04 VITALS
BODY MASS INDEX: 31.99 KG/M2 | HEIGHT: 65 IN | SYSTOLIC BLOOD PRESSURE: 100 MMHG | WEIGHT: 192 LBS | DIASTOLIC BLOOD PRESSURE: 60 MMHG

## 2024-04-04 DIAGNOSIS — E11.9 DIABETES MELLITUS TYPE 2 WITHOUT RETINOPATHY (HCC): Primary | ICD-10-CM

## 2024-04-04 PROBLEM — O99.210 OBESITY COMPLICATING PREGNANCY (HCC): Chronic | Status: ACTIVE | Noted: 2024-02-06

## 2024-04-04 PROBLEM — O24.319 PREEXISTING DIABETES COMPLICATING PREGNANCY, ANTEPARTUM (HCC): Chronic | Status: ACTIVE | Noted: 2023-12-26

## 2024-04-04 PROCEDURE — 99213 OFFICE O/P EST LOW 20 MIN: CPT | Performed by: OBSTETRICS & GYNECOLOGY

## 2024-04-04 NOTE — PROGRESS NOTES
34 y/o  at 32 5/7 W  Diabetes in pregnancy requiring insulin. F/b endocrine. 3/6/2024 hgba1c = 5.8. BS have been elevated. I recommended she send BS to endocrinology to adjust insulin.   Obesity in pregnancy  NST today reactive.   IOL scheduled for 513 AM.     NST DOCUMENTATION   Variability 6-25 BPM   Accelerations Yes   Decelerations None   Baseline 130   Uterine Irritability  none   Contractions none   Acoustic Stimulator No   Nonstress Test Interpretation Reactive

## 2024-04-04 NOTE — TELEPHONE ENCOUNTER
----- Message from Alina Scott MD sent at 4/4/2024 10:57 AM CDT -----  Regarding: schedule IOL  Please schedule this pt for 5/13/2024 AM IOL for diabetes in pregnancy. Likely pitocin (patient is a multip)

## 2024-04-10 ENCOUNTER — ROUTINE PRENATAL (OUTPATIENT)
Dept: OBGYN CLINIC | Facility: CLINIC | Age: 34
End: 2024-04-10
Payer: MEDICAID

## 2024-04-10 VITALS
WEIGHT: 193.19 LBS | BODY MASS INDEX: 32.19 KG/M2 | SYSTOLIC BLOOD PRESSURE: 106 MMHG | DIASTOLIC BLOOD PRESSURE: 64 MMHG | HEIGHT: 65 IN

## 2024-04-10 DIAGNOSIS — O09.93 SUPERVISION OF HIGH RISK PREGNANCY IN THIRD TRIMESTER (HCC): Primary | ICD-10-CM

## 2024-04-10 DIAGNOSIS — O24.319 PREEXISTING DIABETES COMPLICATING PREGNANCY, ANTEPARTUM (HCC): Chronic | ICD-10-CM

## 2024-04-10 PROCEDURE — 99214 OFFICE O/P EST MOD 30 MIN: CPT | Performed by: OBSTETRICS & GYNECOLOGY

## 2024-04-10 PROCEDURE — 59025 FETAL NON-STRESS TEST: CPT | Performed by: OBSTETRICS & GYNECOLOGY

## 2024-04-10 NOTE — PROGRESS NOTES
Doing well. No OB complaints. +FM.   NST reactive.   Reviewed MFM recs for twice weekly NSTs next week. Week of MFM ultrasound only need once.   Induction of labor in the 38th week.   BG not as best controlled. Insulin increased this week.     RADHA 1 week with NST X 2.     Patient Active Problem List   Diagnosis    Anxiety    Depression during pregnancy in third trimester ([facility])    Diabetes mellitus type 2 without retinopathy ([facility])    Myopia of both eyes with astigmatism    Preexisting diabetes complicating pregnancy, antepartum ([facility])    Obesity complicating pregnancy ([facility])    Vaginal discharge    Irregular contractions ([facility])        Dr. Jeremiah Muñoz MD    [facility]     This note was created by [employer] voice recognition. Errors in content may be related to improper recognition by the system; efforts to review and correct have been done but errors may still exist. Please be advised the primary purpose of this note is for me to communicate medical care. Standard sentence structure is not always used. Medical terminology and medical abbreviations may be used. There may be grammatical, typographical, and automated fill ins that may have errors missed in proofreading.       Total patient time was 30 minutes in evaluation and management.

## 2024-04-11 ENCOUNTER — OFFICE VISIT (OUTPATIENT)
Dept: ENDOCRINOLOGY CLINIC | Facility: CLINIC | Age: 34
End: 2024-04-11
Payer: MEDICAID

## 2024-04-11 VITALS
BODY MASS INDEX: 32.32 KG/M2 | WEIGHT: 194 LBS | DIASTOLIC BLOOD PRESSURE: 71 MMHG | HEART RATE: 89 BPM | SYSTOLIC BLOOD PRESSURE: 112 MMHG | HEIGHT: 65 IN

## 2024-04-11 DIAGNOSIS — O24.112 PREGNANCY COMPLICATED BY PRE-EXISTING TYPE 2 DIABETES IN SECOND TRIMESTER (HCC): Primary | ICD-10-CM

## 2024-04-11 LAB
GLUCOSE BLOOD: 93
HEMOGLOBIN A1C: 5.3 % (ref 4.3–5.6)
TEST STRIP LOT #: NORMAL NUMERIC

## 2024-04-11 PROCEDURE — 82947 ASSAY GLUCOSE BLOOD QUANT: CPT

## 2024-04-11 PROCEDURE — 99213 OFFICE O/P EST LOW 20 MIN: CPT

## 2024-04-11 PROCEDURE — 83036 HEMOGLOBIN GLYCOSYLATED A1C: CPT

## 2024-04-11 NOTE — PATIENT INSTRUCTIONS
Increase Lantus to 24 units subcutaneous daily     Change Humalog to 24 units with breakfast, 26 units with lunch, 34 units with dinner.       Follow up 6 weeks after delivery

## 2024-04-11 NOTE — PROGRESS NOTES
Name: Kady Matthews  Date: 4/11/24    Referring Physician: No ref. provider found    HISTORY OF PRESENT ILLNESS   Kady Matthews is a 33 year old female who presents for follow up for diabetes mellitus type 2 complicated by pregnancy in the second trimester.     She has a Hx of GDM during both of her prior pregnancies. She was diet controlled during first pregnancy but did require insulin in her second. She was not on any medication following delivery of second child. Did progress to DM type 2 following second pregnancy. Was found to have HgA1c of 6.9% when seen at  3/2023 and then repeat a few weeks later was improved at 6.6%.     She is now pregnant with 4th baby, currently 33 weeks.  Overall feeling well.    Using Dexcom G7 CGM to monitor blood sugars.     Diabetes History:  Diagnosed- new diagnosis 3/2023, GDM in both of her previous pregnancies     FH DM  - mother - DM type 2  - sister - GDM    Prior glycohemoglobin were: 6.6% 3/2023; 6.0% 10/2023; 5.5% 11/2023; 5.3% 12/2023; 5.3% 1/2024; 5.8% 3/2024; 5.3% POC today     Glucose in clinic- 93mg/dl     Dietary compliance: Good - has met with dietician to review low CHO diet in pregnancy     Recall:  Breakfast- cereal occ. But trying to avoid. Sometimes skips   Lunch- skips at times- doesn't bring lunch for work shift.   Dinner- stews, soups, or protein and vegetable. Sometimes steak with rice, occ. Chicken or pasta.   Snack-  the evening- ice cream or sugar free chocolate.   Beverages- coke zero, or water     Exercise: Active throughout the day, with kids, work. Now having more pelvic pain with this pregnancy.     Polyuria/polydipsia: No   Blurred vision: No - does notice some blurry vision with higher sugars.      Episodes of hypoglycemia: Increase since last visit- some sugars fasting and between meals in the lower 60s- she is symptomatic with these.     Blood Glucose:  Checking 4 times daily Typically using Dexcom G7 but had sensor fall off early and so  has been off CGM for last few weeks:  Reviewed logs:    Current DM Regimen:  -Humalog- 20-24-30 TID with meals   -Lantus 20 units subcutaneous daily    Modifying factors:  Medication adherence: Yes   Recent steroids, illness or infections: Covid 12/2023     REVIEW OF SYSTEMS  Eyes: Diabetic retinopathy present: None known             Most recent visit to eye doctor in last 12 months: Yes, 7/2023     CV: Cardiovascular disease present: No          Hypertension present: No          Hyperlipidemia present: No          Peripheral Vascular Disease present: No     : Nephropathy present: No     Neuro: Neuropathy present: No, denies symptoms     Skin: Infection or ulceration: No     Osteoporosis: No     Thyroid disease: No       Medications:     Current Outpatient Medications:     insulin lispro 100 UNIT/ML Subcutaneous Solution, Inject 18 Units into the skin 3 (three) times daily before meals. 18 units breakfast, 20 units with lunch, and 28 units with dinner, Disp: , Rfl:     insulin glargine 100 UNIT/ML Subcutaneous Solution, Inject 18 Units into the skin nightly., Disp: , Rfl:     Prenatal Multivit-Min-Fe-FA (PRENATAL OR), Take by mouth., Disp: , Rfl:     Glucose Blood (FREESTYLE LITE TEST) In Vitro Strip, Use to check glucose 4 times daily (Patient not taking: Reported on 3/19/2024), Disp: 200 strip, Rfl: 1    FreeStyle Lancets Does not apply Misc, Use to check glucose 4 times daily (Patient not taking: Reported on 3/19/2024), Disp: 200 each, Rfl: 1    Insulin Pen Needle (BD PEN NEEDLE MALISSA U/F) 32G X 4 MM Does not apply Misc, Inject 6 times daily (Patient not taking: Reported on 4/4/2024), Disp: 200 each, Rfl: 2    ASPIRIN 81 OR, Take by mouth., Disp: , Rfl:     Continuous Blood Gluc Sensor (DEXCOM G7 SENSOR) Does not apply Misc, 1 each Every 10 days. Use as directed every 10 days (Patient not taking: Reported on 3/1/2024), Disp: 3 each, Rfl: 3    insulin glargine (LANTUS SOLOSTAR) 100 UNIT/ML Subcutaneous Solution  Pen-injector, Inject 14 Units into the skin nightly. (Patient not taking: Reported on 3/19/2024), Disp: 15 mL, Rfl: 1    Insulin Lispro, 1 Unit Dial, (HUMALOG KWIKPEN) 100 UNIT/ML Subcutaneous Solution Pen-injector, Inject 10 units with breakfast, 8 units with lunch and 14 units with dinner. (Patient not taking: Reported on 4/4/2024), Disp: 15 mL, Rfl: 3    Continuous Blood Gluc Sensor (DEXCOM G7 SENSOR) Does not apply Misc, 1 each Every 10 days. Use as directed every 10 days (Patient not taking: Reported on 3/1/2024), Disp: 3 each, Rfl: 5     Allergies:   Allergies   Allergen Reactions    Melons OTHER (SEE COMMENTS)     Itchy throat       Social History:   Social History     Socioeconomic History    Marital status: Single   Occupational History    Occupation: Unemployed   Tobacco Use    Smoking status: Some Days     Current packs/day: 0.00     Types: Cigarettes     Last attempt to quit: 10/1/2020     Years since quitting: 3.5    Smokeless tobacco: Former   Vaping Use    Vaping status: Never Used   Substance and Sexual Activity    Alcohol use: Not Currently     Comment: occasional    Drug use: Not Currently     Types: Cannabis     Comment: occasionally    Sexual activity: Yes     Partners: Male   Other Topics Concern    Blood Transfusions No       Medical History:   Past Medical History:    Diabetes (HCC)    Preexisting diabetes complicating pregnancy, antepartum (HCC)    was on oral meds prior to pregnancy       Surgical history:   No past surgical history on file.      PHYSICAL EXAM  /71   Pulse 89   Ht 5' 5\" (1.651 m)   Wt 194 lb (88 kg)   LMP 08/19/2023 (Exact Date)   BMI 32.28 kg/m²     General Appearance:  alert, well developed, in no acute distress  Eyes:  normal conjunctivae, sclera, and normal pupils  Neck: Trachea midline: Normal  Back: no kyphosis or back tenderness  Lymph Nodes:  No abnormal nodes noted  Musculoskeletal:  normal muscle strength and tone  Skin:  normal moisture and skin  texture  Hair & Nails:  normal scalp hair     Hematologic:  no excessive bruising  Neuro:  sensory grossly intact and motor grossly intact.  Psychiatric:  oriented to time, self, and place  Nutritional:  no abnormal weight gain or loss      ASSESSMENT/PLAN:    Diabetes mellitus type 2 controlled  -HgA1c- 5.3%  POC today   -Reviewed diagnosis of diabetes  -Reviewed ABC's of diabetes   - Reviewed pathogenesis of diabetes.   - Reviewed importance of good glycemic control to prevent microvascular and  macrovascular complications including nephropathy, neuropathy, retinopathy,  and cardiovascular disease.  -Reviewed importance of good glycemic control in pregnancy to reduce risk of maternal and fetal complications.   - Reviewed importance of SBGM- check glucose 4 times daily - fasting and 2 hours post prandially    - Reviewed target glucose goals for patient during pregnancy <95 fasting and <120 post prandially   - Reviewed importance of following low CHO diet.     -Reviewed diet at length today and congratulated on improvements she has made so far. Offered referral to [facility] to meet with dietician but she feels comfortable from meeting with them in the past for GDM education  -Discussed importance of glycemic control during pregnancy to avoid macrosomnia, fetal dysorganogenesis      -Increase Lantus to 24 units subcutaneous at bedtime, verbalized understanding of risks and benefits     - Increase Humalog to 24 units with breakfast, 26  units with lunch, and 34 units with dinner.  Reviewed insulin timing and administration and risks vs benefits of medication.      - Continue Dexcom G7.  - Send BG levels weekly - reviewed importance of sending weekly Vocalcomt message to review CGM or fingerstick data.   - Discussed checking BG levels 4 times per day and BG targets   -Work on lifestyle changes as discussed above.   -Per OB plan for delivery around 38 weeks.   -Growth US scheduled 4/24/24.     -normotensive  -normal lipids  3/2023    RTC 6 weeks after delivery- review glucose readings weekly until delivery  MIC Rolle  4/11/24    A total of 25 minutes was spent on obtaining history, reviewing pertinent labs, evaluating patient, providing multiple treatment options, reinforcing diet/exercise and compliance, and completing documentation.

## 2024-04-16 ENCOUNTER — LAB ENCOUNTER (OUTPATIENT)
Dept: LAB | Facility: HOSPITAL | Age: 34
End: 2024-04-16
Attending: OBSTETRICS & GYNECOLOGY
Payer: MEDICAID

## 2024-04-16 ENCOUNTER — ROUTINE PRENATAL (OUTPATIENT)
Dept: OBGYN CLINIC | Facility: CLINIC | Age: 34
End: 2024-04-16
Payer: MEDICAID

## 2024-04-16 VITALS
WEIGHT: 194 LBS | BODY MASS INDEX: 32.32 KG/M2 | HEIGHT: 65 IN | SYSTOLIC BLOOD PRESSURE: 98 MMHG | DIASTOLIC BLOOD PRESSURE: 62 MMHG

## 2024-04-16 DIAGNOSIS — O24.319 PREEXISTING DIABETES COMPLICATING PREGNANCY, ANTEPARTUM (HCC): ICD-10-CM

## 2024-04-16 DIAGNOSIS — O24.319 PREEXISTING DIABETES COMPLICATING PREGNANCY, ANTEPARTUM (HCC): Primary | ICD-10-CM

## 2024-04-16 LAB — T PALLIDUM AB SER QL IA: NONREACTIVE

## 2024-04-16 PROCEDURE — 59025 FETAL NON-STRESS TEST: CPT | Performed by: OBSTETRICS & GYNECOLOGY

## 2024-04-16 PROCEDURE — 36415 COLL VENOUS BLD VENIPUNCTURE: CPT

## 2024-04-16 PROCEDURE — 87389 HIV-1 AG W/HIV-1&-2 AB AG IA: CPT

## 2024-04-16 PROCEDURE — 99214 OFFICE O/P EST MOD 30 MIN: CPT | Performed by: OBSTETRICS & GYNECOLOGY

## 2024-04-16 PROCEDURE — 86780 TREPONEMA PALLIDUM: CPT

## 2024-04-16 NOTE — PROGRESS NOTES
Denies pain, bleeding, LOF. Positive fetal movement  Denies headache, vision changes.    3/26 MFM today:   Ultrasound Findings:  Single IUP in cephalic presentation.    Placenta is anterior.   A 3 vessel cord is noted.  Cardiac activity is present at 163 bpm  EFW 1880 g ( 4 lb 2 oz); 56%.    MVP is 5.8 cm . CATHERINE 15.8 cm        33 year old  at 34w3d by LMP     Return OB  Pre- Care: UTD.   GBS swab next visit. HIV, trep orders placed  Continue monthly growth ultrasound with BPP after 32 weeks, weekly NSTs at 32/twice weekly at 34  [id number] trimester - last 24 - 5.3%  Instulin per endocrinology  Patient Active Problem List   Diagnosis    Anxiety    Depression during pregnancy in third trimester (HCC)    Diabetes mellitus type 2 without retinopathy (HCC)    Myopia of both eyes with astigmatism    Preexisting diabetes complicating pregnancy, antepartum (HCC)    Obesity complicating pregnancy (HCC)    Vaginal discharge    Irregular contractions ([facility]CC)     30 min spent with chart review, obtaining history, performing exam, counseling and coordinating care  - Return to clinic in: weekly

## 2024-04-18 ENCOUNTER — ROUTINE PRENATAL (OUTPATIENT)
Dept: OBGYN CLINIC | Facility: CLINIC | Age: 34
End: 2024-04-18
Payer: MEDICAID

## 2024-04-18 ENCOUNTER — PATIENT MESSAGE (OUTPATIENT)
Dept: ENDOCRINOLOGY CLINIC | Facility: CLINIC | Age: 34
End: 2024-04-18

## 2024-04-18 VITALS
WEIGHT: 196.63 LBS | BODY MASS INDEX: 32.76 KG/M2 | DIASTOLIC BLOOD PRESSURE: 64 MMHG | SYSTOLIC BLOOD PRESSURE: 108 MMHG | HEIGHT: 65 IN

## 2024-04-18 DIAGNOSIS — O24.319 PREEXISTING DIABETES COMPLICATING PREGNANCY, ANTEPARTUM (HCC): Chronic | ICD-10-CM

## 2024-04-18 DIAGNOSIS — O09.93 SUPERVISION OF HIGH RISK PREGNANCY IN THIRD TRIMESTER (HCC): Primary | ICD-10-CM

## 2024-04-18 PROCEDURE — 59025 FETAL NON-STRESS TEST: CPT | Performed by: OBSTETRICS & GYNECOLOGY

## 2024-04-18 PROCEDURE — 99213 OFFICE O/P EST LOW 20 MIN: CPT | Performed by: OBSTETRICS & GYNECOLOGY

## 2024-04-18 NOTE — PROGRESS NOTES
Doing well. No OB complaints. +FM.   NST reactive.     RADHA 1 week with NST X 2.     Patient Active Problem List   Diagnosis    Anxiety    Depression during pregnancy in third trimester (HCC)    Diabetes mellitus type 2 without retinopathy (HCC)    Myopia of both eyes with astigmatism    Preexisting diabetes complicating pregnancy, antepartum (HCC)    Obesity complicating pregnancy ([facility])    Vaginal discharge    Irregular contractions ([facility])        Dr. Jeremiah Muñoz MD    [facility]     This note was created by [employer] voice recognition. Errors in content may be related to improper recognition by the system; efforts to review and correct have been done but errors may still exist. Please be advised the primary purpose of this note is for me to communicate medical care. Standard sentence structure is not always used. Medical terminology and medical abbreviations may be used. There may be grammatical, typographical, and automated fill ins that may have errors missed in proofreading.       Total patient time was 20 minutes in evaluation and management.

## 2024-04-24 ENCOUNTER — HOSPITAL ENCOUNTER (OUTPATIENT)
Dept: PERINATAL CARE | Facility: HOSPITAL | Age: 34
Discharge: HOME | End: 2024-04-24
Attending: OBSTETRICS & GYNECOLOGY
Payer: MEDICAID

## 2024-04-24 VITALS
HEART RATE: 97 BPM | SYSTOLIC BLOOD PRESSURE: 102 MMHG | WEIGHT: 196 LBS | BODY MASS INDEX: 33 KG/M2 | DIASTOLIC BLOOD PRESSURE: 66 MMHG

## 2024-04-24 DIAGNOSIS — O99.213 OTHER OBESITY AFFECTING PREGNANCY IN THIRD TRIMESTER (HCC): Chronic | ICD-10-CM

## 2024-04-24 DIAGNOSIS — E11.9 DIABETES MELLITUS TYPE 2 WITHOUT RETINOPATHY (HCC): Primary | ICD-10-CM

## 2024-04-24 DIAGNOSIS — E66.8 OTHER OBESITY AFFECTING PREGNANCY IN THIRD TRIMESTER (HCC): Chronic | ICD-10-CM

## 2024-04-24 DIAGNOSIS — E11.9 DIABETES MELLITUS TYPE 2 WITHOUT RETINOPATHY (HCC): ICD-10-CM

## 2024-04-24 PROCEDURE — 76819 FETAL BIOPHYS PROFIL W/O NST: CPT

## 2024-04-24 PROCEDURE — 76816 OB US FOLLOW-UP PER FETUS: CPT | Performed by: OBSTETRICS & GYNECOLOGY

## 2024-04-24 NOTE — ADDENDUM NOTE
Encounter addended by: Nadiya Rausch on: 4/24/2024 11:10 AM   Actions taken: Charge Capture section accepted

## 2024-04-24 NOTE — PROGRESS NOTES
Outpatient Maternal-Fetal Medicine Follow-Up    Dear     Thank you for requesting an ultrasound and maternal-fetal medicine consultation on your patient Kady Matthews.  As you are aware she is a 33 year old female  with a hutton pregnancy and an Estimated Date of Delivery: 24.  She returned to maternal-fetal medicine today for a follow-up visit.  Her history was reviewed from her prior visit and there were no interval changes.    Antepartum Risk Factors  Type 2 diabetes complicating pregnancy -  managed by endocrinology  Obesity  History of small for gestational age     PHYSICAL EXAMINATION:  /66   Pulse 97   Wt 196 lb (88.9 kg)   LMP 2023 (Exact Date)   BMI 32.62 kg/m²   General: alert and oriented, no acute distress  Abdomen: gravid, soft, non-tender  Extremities: non-tender, no edema      DISCUSSION  During her visit we discussed and reviewed the following issues:  OBESITY  See prior MFM notes for a detailed review.    DIABETES MELLITUS - Dr. Fisher is her endocrinologist     Last eye exam - 2023; no retinopathy  Baseline p/c - 0.09  A1c - 6.9%  HGBA1C:          Lab Results   Component Value Date     A1C 5.3 2024     A1C 5.3 2023     A1C 5.5 2023      2023         Her prior pregnancies were complicated with GDM and she was diagnosed with Type II in 2023.  In preganncy she has been started on insulin and managed by endocrinology.  Her recent HgbA1c 23 was improved to 5.3% (from 6.9%).     Medical Regimen Recommendation:   Continue to follow up closely with her endocrinology team  Please see previous MFM detailed discussion.         OB ULTRASOUND REPORT   See imaging tab for complete ultrasound report or in PACS    Single IUP in cephalic presentation.    Placenta is anterior.   A 3 vessel cord is noted.  Cardiac activity is present at 167 bpm  EFW 2592 g ( 5 lb 11 oz); 37%.    CATHERINE is  11.2 cm.  MVP is 3.2  cm      BIOPHYSICAL PROFILE:  Movement:    2/2  Tone:            2/2  Breathin/2  Fluid:             2/2  TOTAL:               IMPRESSION:  IUP at  35w4d   Type 2 diabetes complicating pregnancy -  managed by endocrinology  Obesity  History of small for gestational age      RECOMMENDATIONS:  Continue care with   Weekly NST  / twice weekly at  34  wks      Hgb A1C q trimester  Insulin endocrinology team  Aspirin  mg daily , stop at 37wks  Delivery for  pre-existing diabetes without other complication 38-39wks         Thank you for allowing me to participate in the care of your patient.  Please do not hesitate to contact me if additional questions or concerns arise.      Mayur Sunshine D.O.  Maternal Fetal Medicine     20 minutes spent in review of records, patient consultation, documentation and coordination of care.  The relevant clinical matter(s) are summarized above.     Note to patient and family  The  [employer] Cures Act makes medical notes available to patients in the interest of transparency.  However, please be advised that this is a medical document.  It is intended as luab-wl-pysw communication.  It is written and medical language may contain abbreviations or verbiage that are technical and unfamiliar.  It may appear blunt or direct.  Medical documents are intended to carry relevant information, facts as evident, and the clinical opinion of the practitioner.

## 2024-04-26 ENCOUNTER — ROUTINE PRENATAL (OUTPATIENT)
Dept: OBGYN CLINIC | Facility: CLINIC | Age: 34
End: 2024-04-26
Payer: MEDICAID

## 2024-04-26 VITALS
WEIGHT: 195 LBS | HEIGHT: 65 IN | BODY MASS INDEX: 32.49 KG/M2 | SYSTOLIC BLOOD PRESSURE: 110 MMHG | DIASTOLIC BLOOD PRESSURE: 64 MMHG

## 2024-04-26 DIAGNOSIS — O09.93 SUPERVISION OF HIGH RISK PREGNANCY IN THIRD TRIMESTER (HCC): Primary | ICD-10-CM

## 2024-04-26 DIAGNOSIS — O24.319 PREEXISTING DIABETES COMPLICATING PREGNANCY, ANTEPARTUM (HCC): ICD-10-CM

## 2024-04-26 PROCEDURE — 99213 OFFICE O/P EST LOW 20 MIN: CPT | Performed by: OBSTETRICS & GYNECOLOGY

## 2024-04-26 PROCEDURE — 59025 FETAL NON-STRESS TEST: CPT | Performed by: OBSTETRICS & GYNECOLOGY

## 2024-04-26 PROCEDURE — 87081 CULTURE SCREEN ONLY: CPT | Performed by: OBSTETRICS & GYNECOLOGY

## 2024-04-26 PROCEDURE — 87150 DNA/RNA AMPLIFIED PROBE: CPT | Performed by: OBSTETRICS & GYNECOLOGY

## 2024-04-26 NOTE — PROGRESS NOTES
33 year old  at 35w6d     Contractions: No  VB: No  LOF: No  Fetal movement: Yes    Return OB  Pre-[facility] Care: UTD.   - GBS collected today.    Pre-gestational DM  - insulin per endocrine - just increased dose today.  - MFM following  - twice weekly NST  reactive today  - delivery 28-39 wks  Patient Active Problem List   Diagnosis    Anxiety    Depression during pregnancy in third trimester (HCC)    Diabetes mellitus type 2 without retinopathy (HCC)    Myopia of both eyes with astigmatism    Preexisting diabetes complicating pregnancy, antepartum (HCC)    Obesity complicating pregnancy (HCC)    Vaginal discharge    Irregular contractions (HCC)       - Return to clinic in: next week  Total face to face time was 20 minutes, more than 50% of the time was spent in counseling and/or coordination of care related to third trimester pregnancy, labor precuations, DM control.      Sapphire Spangler, DO

## 2024-04-27 LAB — GROUP B STREP BY PCR FOR PCR OVT: NEGATIVE

## 2024-04-29 ENCOUNTER — APPOINTMENT (OUTPATIENT)
Dept: ULTRASOUND IMAGING | Facility: HOSPITAL | Age: 34
End: 2024-04-29
Attending: OBSTETRICS & GYNECOLOGY
Payer: MEDICAID

## 2024-04-29 ENCOUNTER — HOSPITAL ENCOUNTER (OUTPATIENT)
Facility: HOSPITAL | Age: 34
Discharge: HOME | End: 2024-04-29
Attending: OBSTETRICS & GYNECOLOGY | Admitting: OBSTETRICS & GYNECOLOGY
Payer: MEDICAID

## 2024-04-29 ENCOUNTER — ROUTINE PRENATAL (OUTPATIENT)
Dept: OBGYN CLINIC | Facility: CLINIC | Age: 34
End: 2024-04-29
Payer: MEDICAID

## 2024-04-29 VITALS — DIASTOLIC BLOOD PRESSURE: 51 MMHG | HEART RATE: 91 BPM | SYSTOLIC BLOOD PRESSURE: 95 MMHG

## 2024-04-29 VITALS
HEIGHT: 65 IN | DIASTOLIC BLOOD PRESSURE: 68 MMHG | BODY MASS INDEX: 32.19 KG/M2 | SYSTOLIC BLOOD PRESSURE: 108 MMHG | WEIGHT: 193.19 LBS

## 2024-04-29 DIAGNOSIS — O09.93 SUPERVISION OF HIGH RISK PREGNANCY IN THIRD TRIMESTER (HCC): Primary | ICD-10-CM

## 2024-04-29 PROCEDURE — 99214 OFFICE O/P EST MOD 30 MIN: CPT

## 2024-04-29 PROCEDURE — 59025 FETAL NON-STRESS TEST: CPT

## 2024-04-29 PROCEDURE — 76818 FETAL BIOPHYS PROFILE W/NST: CPT | Performed by: OBSTETRICS & GYNECOLOGY

## 2024-04-29 PROCEDURE — 76819 FETAL BIOPHYS PROFIL W/O NST: CPT | Performed by: OBSTETRICS & GYNECOLOGY

## 2024-04-29 NOTE — PROGRESS NOTES
Doing well. No OB complaints. +FM.   Just ate and noted lots of fetal movement since.   Sent to OB triage for fetal tachycardia to 160's with variables.   On call MD and OB triage aware of pending arrival.     RADHA this week for NST.     Dr. Jeremiah Muñoz MD    [facility]     This note was created by [employer] voice recognition. Errors in content may be related to improper recognition by the system; efforts to review and correct have been done but errors may still exist. Please be advised the primary purpose of this note is for me to communicate medical care. Standard sentence structure is not always used. Medical terminology and medical abbreviations may be used. There may be grammatical, typographical, and automated fill ins that may have errors missed in proofreading.       Patient Active Problem List   Diagnosis    Anxiety    Depression during pregnancy in third trimester (HCC)    Diabetes mellitus type 2 without retinopathy (HCC)    Myopia of both eyes with astigmatism    Preexisting diabetes complicating pregnancy, antepartum (HCC)    Obesity complicating pregnancy (HCC)    Vaginal discharge    Irregular contractions (HCC)        Total patient time was 30 minutes in evaluation and management.

## 2024-04-29 NOTE — TRIAGE
[facility]  part of [facility]      Triage Note    Kady Matthews Patient Status:  Outpatient    1990 MRN [id number]   Location [facility] Attending Donya Romero MD   Hosp Day # 0 PCP Amy Campa MD      Para:   Estimated Date of Delivery: 24  Gestation: 36w2d    Chief Complaint    Non-stress Test         Allergies:    Allergies   Allergen Reactions    Melons OTHER (SEE COMMENTS)     Itchy throat       No orders of the defined types were placed in this encounter.      Lab Results   Component Value Date    WBC 10.7 2024    HGB 11.8 (L) 2024    HCT 35.9 2024    .0 2024    CREATSERUM 0.58 2023    BUN 6 (L) 2023     2023    K 4.0 2023     2023    CO2 20.0 (L) 2023    GLU 87 2023    CA 9.1 2023    ALB 3.8 2023    ALKPHO 41 2023    BILT 0.4 2023    TP 6.5 2023    AST 14 2023    ALT 16 2023       Clinitek UA  Lab Results   Component Value Date    URINECUL  2024     <10,000 cfu/ml Mixture of Gram positive organisms isolated - probable contamination.       UA  No results found for: \"COLORUR\", \"CLARITY\", \"SPECGRAVITY\", \"PROUR\", \"GLUUR\", \"KETUR\", \"BILUR\", \"BLOODURINE\", \"NITRITE\", \"UROBILINOGEN\", \"LEUUR\", \"UASA\"    Vitals:    24 1526   BP: 95/51   Pulse: 91       NST  Variability: Moderate           Accelerations: Yes           Decelerations: None            Baseline: 140 BPM           Uterine Irritability: No           Contractions: Irregular                    Contraction Frequency: x2                   Acoustic Stimulator: No           Nonstress Test Interpretation: Reactive           Nonstress Test Second Interpretation: Reactive          FHR Category: Category I             Additional Comments Comments: , 36.2 weeks, sent from office for fetal tachycardia and variable decelerations. +TIFFANIE.    Heather notified of reactive FHR tracing and of BPP 8/8, discharge order received. Patient instructed on kick counts and labor precautions, denies questions at this time. Patient home ambulatroy.     Chief Complaint   Patient presents with    Non-stress Test     Sent from office for fetal tachycardia and variable decelerations on NST         Jazz PEREZ RN  4/29/2024 6:15 PM

## 2024-04-29 NOTE — PROGRESS NOTES
Pt is a 33 year old female admitted to TR1/TR1-A.     Chief Complaint   Patient presents with    Non-stress Test     Sent from office for fetal tachycardia and variable decelerations on NST      Pt is  36w2d intra-uterine pregnancy.  History obtained, consents signed. Oriented to room, staff, and plan of care.

## 2024-05-01 ENCOUNTER — ROUTINE PRENATAL (OUTPATIENT)
Dept: OBGYN CLINIC | Facility: CLINIC | Age: 34
End: 2024-05-01
Payer: MEDICAID

## 2024-05-01 VITALS
BODY MASS INDEX: 32.19 KG/M2 | HEIGHT: 65 IN | DIASTOLIC BLOOD PRESSURE: 66 MMHG | WEIGHT: 193.19 LBS | SYSTOLIC BLOOD PRESSURE: 110 MMHG

## 2024-05-01 DIAGNOSIS — O09.93 SUPERVISION OF HIGH RISK PREGNANCY IN THIRD TRIMESTER (HCC): Primary | ICD-10-CM

## 2024-05-01 PROCEDURE — 99213 OFFICE O/P EST LOW 20 MIN: CPT | Performed by: OBSTETRICS & GYNECOLOGY

## 2024-05-01 PROCEDURE — 59025 FETAL NON-STRESS TEST: CPT | Performed by: OBSTETRICS & GYNECOLOGY

## 2024-05-01 NOTE — PROGRESS NOTES
Doing well. No OB complaints. +FM.   NST reactive.   Induction of labor scheduled for preexisting DM 5/13/24 at 38 weeks.     RADHA 1 week X 2 with NSTs.     Dr. Jeremiah Muñoz MD    [facility]     This note was created by [employer] voice recognition. Errors in content may be related to improper recognition by the system; efforts to review and correct have been done but errors may still exist. Please be advised the primary purpose of this note is for me to communicate medical care. Standard sentence structure is not always used. Medical terminology and medical abbreviations may be used. There may be grammatical, typographical, and automated fill ins that may have errors missed in proofreading.       Patient Active Problem List   Diagnosis    Anxiety    Depression during pregnancy in third trimester (HCC)    Diabetes mellitus type 2 without retinopathy (HCC)    Myopia of both eyes with astigmatism    Preexisting diabetes complicating pregnancy, antepartum (HCC)    Obesity complicating pregnancy (HCC)    Vaginal discharge    Irregular contractions (HCC)        Total patient time was 20 minutes in evaluation and management.

## 2024-05-02 RX ORDER — INSULIN LISPRO 100 [IU]/ML
INJECTION, SOLUTION INTRAVENOUS; SUBCUTANEOUS
Qty: 30 ML | Refills: 1 | Status: SHIPPED | OUTPATIENT
Start: 2024-05-02

## 2024-05-06 ENCOUNTER — ROUTINE PRENATAL (OUTPATIENT)
Dept: OBGYN CLINIC | Facility: CLINIC | Age: 34
End: 2024-05-06
Payer: MEDICAID

## 2024-05-06 VITALS
DIASTOLIC BLOOD PRESSURE: 70 MMHG | SYSTOLIC BLOOD PRESSURE: 112 MMHG | WEIGHT: 192.81 LBS | HEIGHT: 65 IN | BODY MASS INDEX: 32.12 KG/M2

## 2024-05-06 DIAGNOSIS — O24.319 PREEXISTING DIABETES COMPLICATING PREGNANCY, ANTEPARTUM (HCC): Primary | ICD-10-CM

## 2024-05-06 NOTE — PROGRESS NOTES
33 year old  at 37w2d     Contractions: No  VB: No  LOF: No  Fetal movement: Yes    Return OB  Pre-Lopez Care: UTD.      Pre-gestational DM  - insulin per endocrine   - MFM following - ultrasound :  cephalic presentation.  EFW 2592 g ( 5 lb 11 oz); 37%.    - twice weekly NST - reactive today   - induction of labor scheduled   Patient Active Problem List   Diagnosis    Anxiety    Depression during pregnancy in third trimester (HCC)    Diabetes mellitus type 2 without retinopathy (HCC)    Myopia of both eyes with astigmatism    Preexisting diabetes complicating pregnancy, antepartum (HCC)    Obesity complicating pregnancy (HCC)    Vaginal discharge    Irregular contractions (HCC)       - Return to clinic in: next week  Total face to face time was 20 minutes, more than 50% of the time was spent in counseling and/or coordination of care related to third trimester pregnancy, labor precuations, DM control.    Donya Romero MD

## 2024-05-08 ENCOUNTER — ROUTINE PRENATAL (OUTPATIENT)
Dept: OBGYN CLINIC | Facility: CLINIC | Age: 34
End: 2024-05-08
Payer: MEDICAID

## 2024-05-08 VITALS
WEIGHT: 193.81 LBS | HEIGHT: 65 IN | DIASTOLIC BLOOD PRESSURE: 70 MMHG | BODY MASS INDEX: 32.29 KG/M2 | SYSTOLIC BLOOD PRESSURE: 116 MMHG

## 2024-05-08 DIAGNOSIS — O09.93 SUPERVISION OF HIGH RISK PREGNANCY IN THIRD TRIMESTER (HCC): ICD-10-CM

## 2024-05-08 DIAGNOSIS — Z36.9 UNSPECIFIED ANTENATAL SCREENING (HCC): Primary | ICD-10-CM

## 2024-05-08 DIAGNOSIS — O24.319 PREEXISTING DIABETES COMPLICATING PREGNANCY, ANTEPARTUM (HCC): Chronic | ICD-10-CM

## 2024-05-08 PROCEDURE — 59025 FETAL NON-STRESS TEST: CPT | Performed by: OBSTETRICS & GYNECOLOGY

## 2024-05-08 PROCEDURE — 99214 OFFICE O/P EST MOD 30 MIN: CPT | Performed by: OBSTETRICS & GYNECOLOGY

## 2024-05-08 NOTE — PROGRESS NOTES
Doing well. No OB complaints. +FM.   SVE 1/50/-3 declined membrane stripping.   Palpated cephalic vaginally.   NST reactive.     RADHA postpartum. Induction of labor Monday at [id number].     Dr. Jeremiah Muñoz MD    [facility]     This note was created by [employer] voice recognition. Errors in content may be related to improper recognition by the system; efforts to review and correct have been done but errors may still exist. Please be advised the primary purpose of this note is for me to communicate medical care. Standard sentence structure is not always used. Medical terminology and medical abbreviations may be used. There may be grammatical, typographical, and automated fill ins that may have errors missed in proofreading.       Patient Active Problem List   Diagnosis    Anxiety    Depression during pregnancy in third trimester (HCC)    Diabetes mellitus type 2 without retinopathy (HCC)    Myopia of both eyes with astigmatism    Preexisting diabetes complicating pregnancy, antepartum (HCC)    Obesity complicating pregnancy (HCC)    Vaginal discharge    Irregular contractions (HCC)        Total patient time was 30 minutes in evaluation and management.

## 2024-05-13 ENCOUNTER — ANESTHESIA (OUTPATIENT)
Dept: OBGYN UNIT | Facility: HOSPITAL | Age: 34
End: 2024-05-13

## 2024-05-13 ENCOUNTER — ANESTHESIA EVENT (OUTPATIENT)
Dept: OBGYN UNIT | Facility: HOSPITAL | Age: 34
End: 2024-05-13

## 2024-05-13 ENCOUNTER — HOSPITAL ENCOUNTER (INPATIENT)
Facility: HOSPITAL | Age: 34
LOS: 2 days | Discharge: HOME | End: 2024-05-15
Attending: OBSTETRICS & GYNECOLOGY | Admitting: OBSTETRICS & GYNECOLOGY

## 2024-05-13 ENCOUNTER — APPOINTMENT (OUTPATIENT)
Dept: OBGYN CLINIC | Facility: HOSPITAL | Age: 34
End: 2024-05-13
Attending: OBSTETRICS & GYNECOLOGY

## 2024-05-13 PROBLEM — Z34.90 PREGNANCY (HCC): Status: ACTIVE | Noted: 2024-05-13

## 2024-05-13 LAB
ANTIBODY SCREEN: NEGATIVE
BASOPHILS # BLD AUTO: 0.02 X10(3) UL (ref 0–0.2)
BASOPHILS NFR BLD AUTO: 0.2 %
DEPRECATED RDW RBC AUTO: 43.3 FL (ref 35.1–46.3)
EOSINOPHIL # BLD AUTO: 0.19 X10(3) UL (ref 0–0.7)
EOSINOPHIL NFR BLD AUTO: 2.1 %
ERYTHROCYTE [DISTWIDTH] IN BLOOD BY AUTOMATED COUNT: 13.8 % (ref 11–15)
GLUCOSE BLDC GLUCOMTR-MCNC: 102 MG/DL (ref 70–99)
GLUCOSE BLDC GLUCOMTR-MCNC: 78 MG/DL (ref 70–99)
GLUCOSE BLDC GLUCOMTR-MCNC: 92 MG/DL (ref 70–99)
GLUCOSE BLDC GLUCOMTR-MCNC: 99 MG/DL (ref 70–99)
HCT VFR BLD AUTO: 37.4 %
HGB BLD-MCNC: 12.2 G/DL
IMM GRANULOCYTES # BLD AUTO: 0.05 X10(3) UL (ref 0–1)
IMM GRANULOCYTES NFR BLD: 0.5 %
LYMPHOCYTES # BLD AUTO: 1.38 X10(3) UL (ref 1–4)
LYMPHOCYTES NFR BLD AUTO: 15.1 %
MCH RBC QN AUTO: 28.4 PG (ref 26–34)
MCHC RBC AUTO-ENTMCNC: 32.6 G/DL (ref 31–37)
MCV RBC AUTO: 87 FL
MONOCYTES # BLD AUTO: 0.94 X10(3) UL (ref 0.1–1)
MONOCYTES NFR BLD AUTO: 10.3 %
NEUTROPHILS # BLD AUTO: 6.53 X10 (3) UL (ref 1.5–7.7)
NEUTROPHILS # BLD AUTO: 6.53 X10(3) UL (ref 1.5–7.7)
NEUTROPHILS NFR BLD AUTO: 71.8 %
PLATELET # BLD AUTO: 289 10(3)UL (ref 150–450)
RBC # BLD AUTO: 4.3 X10(6)UL
RH BLOOD TYPE: POSITIVE
WBC # BLD AUTO: 9.1 X10(3) UL (ref 4–11)

## 2024-05-13 PROCEDURE — 10907ZC DRAINAGE OF AMNIOTIC FLUID, THERAPEUTIC FROM PRODUCTS OF CONCEPTION, VIA NATURAL OR ARTIFICIAL OPENING: ICD-10-PCS | Performed by: OBSTETRICS & GYNECOLOGY

## 2024-05-13 PROCEDURE — 3E0P7VZ INTRODUCTION OF HORMONE INTO FEMALE REPRODUCTIVE, VIA NATURAL OR ARTIFICIAL OPENING: ICD-10-PCS | Performed by: OBSTETRICS & GYNECOLOGY

## 2024-05-13 PROCEDURE — 59409 OBSTETRICAL CARE: CPT | Performed by: OBSTETRICS & GYNECOLOGY

## 2024-05-13 RX ORDER — ACETAMINOPHEN 500 MG
1000 TABLET ORAL EVERY 6 HOURS PRN
Status: DISCONTINUED | OUTPATIENT
Start: 2024-05-13 | End: 2024-05-15

## 2024-05-13 RX ORDER — BUPIVACAINE HYDROCHLORIDE 2.5 MG/ML
20 INJECTION, SOLUTION EPIDURAL; INFILTRATION; INTRACAUDAL ONCE
Status: DISCONTINUED | OUTPATIENT
Start: 2024-05-13 | End: 2024-05-13 | Stop reason: HOSPADM

## 2024-05-13 RX ORDER — IBUPROFEN 600 MG/1
600 TABLET ORAL ONCE AS NEEDED
Status: DISCONTINUED | OUTPATIENT
Start: 2024-05-13 | End: 2024-05-13 | Stop reason: HOSPADM

## 2024-05-13 RX ORDER — LIDOCAINE HYDROCHLORIDE AND EPINEPHRINE 15; 5 MG/ML; UG/ML
INJECTION, SOLUTION EPIDURAL
Status: COMPLETED | OUTPATIENT
Start: 2024-05-13 | End: 2024-05-13

## 2024-05-13 RX ORDER — ACETAMINOPHEN 500 MG
500 TABLET ORAL EVERY 6 HOURS PRN
Status: DISCONTINUED | OUTPATIENT
Start: 2024-05-13 | End: 2024-05-13 | Stop reason: HOSPADM

## 2024-05-13 RX ORDER — AMMONIA INHALANTS 0.04 G/.3ML
0.3 INHALANT RESPIRATORY (INHALATION) AS NEEDED
Status: DISCONTINUED | OUTPATIENT
Start: 2024-05-13 | End: 2024-05-15

## 2024-05-13 RX ORDER — DOCUSATE SODIUM 100 MG/1
100 CAPSULE, LIQUID FILLED ORAL
Status: DISCONTINUED | OUTPATIENT
Start: 2024-05-13 | End: 2024-05-14

## 2024-05-13 RX ORDER — SODIUM CHLORIDE, SODIUM LACTATE, POTASSIUM CHLORIDE, CALCIUM CHLORIDE 600; 310; 30; 20 MG/100ML; MG/100ML; MG/100ML; MG/100ML
INJECTION, SOLUTION INTRAVENOUS CONTINUOUS
Status: DISCONTINUED | OUTPATIENT
Start: 2024-05-13 | End: 2024-05-13 | Stop reason: HOSPADM

## 2024-05-13 RX ORDER — NICOTINE POLACRILEX 4 MG
30 LOZENGE BUCCAL
Status: DISCONTINUED | OUTPATIENT
Start: 2024-05-13 | End: 2024-05-15

## 2024-05-13 RX ORDER — TERBUTALINE SULFATE 1 MG/ML
0.25 INJECTION, SOLUTION SUBCUTANEOUS AS NEEDED
Status: DISCONTINUED | OUTPATIENT
Start: 2024-05-13 | End: 2024-05-13 | Stop reason: HOSPADM

## 2024-05-13 RX ORDER — ACETAMINOPHEN 500 MG
500 TABLET ORAL EVERY 6 HOURS PRN
Status: DISCONTINUED | OUTPATIENT
Start: 2024-05-13 | End: 2024-05-15

## 2024-05-13 RX ORDER — NICOTINE POLACRILEX 4 MG
15 LOZENGE BUCCAL
Status: DISCONTINUED | OUTPATIENT
Start: 2024-05-13 | End: 2024-05-15

## 2024-05-13 RX ORDER — NALBUPHINE HYDROCHLORIDE 10 MG/ML
2.5 INJECTION, SOLUTION INTRAMUSCULAR; INTRAVENOUS; SUBCUTANEOUS
Status: DISCONTINUED | OUTPATIENT
Start: 2024-05-13 | End: 2024-05-13

## 2024-05-13 RX ORDER — BUPIVACAINE HCL/0.9 % NACL/PF 0.25 %
5 PLASTIC BAG, INJECTION (ML) EPIDURAL AS NEEDED
Status: DISCONTINUED | OUTPATIENT
Start: 2024-05-13 | End: 2024-05-13

## 2024-05-13 RX ORDER — IBUPROFEN 600 MG/1
600 TABLET ORAL EVERY 6 HOURS
Status: DISCONTINUED | OUTPATIENT
Start: 2024-05-13 | End: 2024-05-14

## 2024-05-13 RX ORDER — DEXTROSE MONOHYDRATE 25 G/50ML
50 INJECTION, SOLUTION INTRAVENOUS
Status: DISCONTINUED | OUTPATIENT
Start: 2024-05-13 | End: 2024-05-15

## 2024-05-13 RX ORDER — DEXTROSE, SODIUM CHLORIDE, SODIUM LACTATE, POTASSIUM CHLORIDE, AND CALCIUM CHLORIDE 5; .6; .31; .03; .02 G/100ML; G/100ML; G/100ML; G/100ML; G/100ML
INJECTION, SOLUTION INTRAVENOUS AS NEEDED
Status: DISCONTINUED | OUTPATIENT
Start: 2024-05-13 | End: 2024-05-13 | Stop reason: HOSPADM

## 2024-05-13 RX ORDER — CITRIC ACID/SODIUM CITRATE 334-500MG
30 SOLUTION, ORAL ORAL AS NEEDED
Status: DISCONTINUED | OUTPATIENT
Start: 2024-05-13 | End: 2024-05-13 | Stop reason: HOSPADM

## 2024-05-13 RX ORDER — LIDOCAINE HYDROCHLORIDE 10 MG/ML
INJECTION, SOLUTION INFILTRATION; PERINEURAL
Status: COMPLETED | OUTPATIENT
Start: 2024-05-13 | End: 2024-05-13

## 2024-05-13 RX ORDER — ONDANSETRON 2 MG/ML
4 INJECTION INTRAMUSCULAR; INTRAVENOUS EVERY 6 HOURS PRN
Status: DISCONTINUED | OUTPATIENT
Start: 2024-05-13 | End: 2024-05-13 | Stop reason: HOSPADM

## 2024-05-13 RX ORDER — ONDANSETRON 2 MG/ML
4 INJECTION INTRAMUSCULAR; INTRAVENOUS EVERY 6 HOURS PRN
Status: DISCONTINUED | OUTPATIENT
Start: 2024-05-13 | End: 2024-05-15

## 2024-05-13 RX ORDER — BUPIVACAINE HYDROCHLORIDE 2.5 MG/ML
INJECTION, SOLUTION EPIDURAL; INFILTRATION; INTRACAUDAL
Status: COMPLETED | OUTPATIENT
Start: 2024-05-13 | End: 2024-05-13

## 2024-05-13 RX ORDER — SIMETHICONE 80 MG
80 TABLET,CHEWABLE ORAL 3 TIMES DAILY PRN
Status: DISCONTINUED | OUTPATIENT
Start: 2024-05-13 | End: 2024-05-15

## 2024-05-13 RX ORDER — NALBUPHINE HYDROCHLORIDE 10 MG/ML
10 INJECTION, SOLUTION INTRAMUSCULAR; INTRAVENOUS; SUBCUTANEOUS EVERY 6 HOURS PRN
Status: DISCONTINUED | OUTPATIENT
Start: 2024-05-13 | End: 2024-05-13 | Stop reason: HOSPADM

## 2024-05-13 RX ORDER — HYDROXYZINE HYDROCHLORIDE 50 MG/ML
50 INJECTION, SOLUTION INTRAMUSCULAR EVERY 6 HOURS PRN
Status: DISCONTINUED | OUTPATIENT
Start: 2024-05-13 | End: 2024-05-13 | Stop reason: HOSPADM

## 2024-05-13 RX ORDER — BENZOCAINE/MENTHOL 6 MG-10 MG
1 LOZENGE MUCOUS MEMBRANE EVERY 6 HOURS PRN
Status: DISCONTINUED | OUTPATIENT
Start: 2024-05-13 | End: 2024-05-15

## 2024-05-13 RX ORDER — LIDOCAINE HYDROCHLORIDE 10 MG/ML
30 INJECTION, SOLUTION EPIDURAL; INFILTRATION; INTRACAUDAL; PERINEURAL ONCE
Status: DISCONTINUED | OUTPATIENT
Start: 2024-05-13 | End: 2024-05-13 | Stop reason: HOSPADM

## 2024-05-13 RX ORDER — ACETAMINOPHEN 500 MG
1000 TABLET ORAL EVERY 6 HOURS PRN
Status: DISCONTINUED | OUTPATIENT
Start: 2024-05-13 | End: 2024-05-13 | Stop reason: HOSPADM

## 2024-05-13 RX ORDER — BISACODYL 10 MG
10 SUPPOSITORY, RECTAL RECTAL ONCE AS NEEDED
Status: DISCONTINUED | OUTPATIENT
Start: 2024-05-13 | End: 2024-05-15

## 2024-05-13 RX ADMIN — BUPIVACAINE HYDROCHLORIDE 10 ML: 2.5 INJECTION, SOLUTION EPIDURAL; INFILTRATION; INTRACAUDAL at 18:05:00

## 2024-05-13 RX ADMIN — LIDOCAINE HYDROCHLORIDE AND EPINEPHRINE 3 ML: 15; 5 INJECTION, SOLUTION EPIDURAL at 18:05:00

## 2024-05-13 RX ADMIN — LIDOCAINE HYDROCHLORIDE 5 ML: 10 INJECTION, SOLUTION INFILTRATION; PERINEURAL at 18:00:00

## 2024-05-13 NOTE — CM/SW NOTE
SW self referral due to finances/WIC resources    SW met with patient bedside.  SW confirmed face sheet contact as correct.    Baby boy/girl name:Naomie Engel  Date & time of delivery:5/13/24 @ 6:23pm  Delivery method:Normal spontaneous vaginal delivery   Siblings age:7 and 2 yr old    Patient employed:Denied   Length of maternity leave:n/a    Father of baby employed:Yes   Length of paternity leave:6 weeks    Breast or formula feed:Breast and formula feed    Pediatrician:Dr. Campa  SW encouraged pt to schedule infant first appointment (usually within 48 hours of discharge) prior to pt discharge. Pt expressed understanding.     Infant Insurance:Medicaid  [employer] [facility] contacted:Yes    Mental Health History: Pt endorses a hx of anxiety and depression    Medications:Denied    Therapist:Hx, not current    Psychiatrist:Denied    SW discussed signs, symptoms and risks associated with post partum depression & anxiety.  SW provided pt with PMAD resources.  Other resources provided:[place], [employer] Medicaid transportation and mental health resources. [facility] specific resources.    Patient support system:Extended family    Patient denied current questions/needs from [facility].    SW/CM to remain available for support and/or discharge planning.      Anabell Araya MSW, LSW  Social Work   Ext:#[phone]

## 2024-05-13 NOTE — PROGRESS NOTES
Pt is a 33 year old female admitted to LDR4/LDR4-A.     Chief Complaint   Patient presents with    Scheduled Induction     Type 2 DM      Pt is  38w2d intra-uterine pregnancy.  History obtained, consents signed. Oriented to room, staff, and plan of care.
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
## 2024-05-13 NOTE — ANESTHESIA PREPROCEDURE EVALUATION
Anesthesia PreOp Note    HPI:     Kady Matthews is a 33 year old female who presents for preoperative consultation requested by: * No surgeons listed *    Date of Surgery: 5/13/2024    * No procedures listed *  Indication: * No pre-op diagnosis entered *    Relevant Problems   No relevant active problems       NPO:                         History Review:  Patient Active Problem List    Diagnosis Date Noted    Pregnancy ([facility]) 05/13/2024    Vaginal discharge 03/26/2024    Irregular contractions ([facility]) 03/26/2024    Obesity complicating pregnancy ([facility]) 02/06/2024    Preexisting diabetes complicating pregnancy, antepartum ([facility]) 12/26/2023    Diabetes mellitus type 2 without retinopathy ([facility]) 07/25/2023    Myopia of both eyes with astigmatism 07/25/2023    Anxiety 05/26/2021    Depression during pregnancy in third trimester ([facility]) 05/26/2021       Past Medical History:    Diabetes ([facility])    Preexisting diabetes complicating pregnancy, antepartum ([facility])    was on oral meds prior to pregnancy       History reviewed. No pertinent surgical history.    Medications Prior to Admission   Medication Sig Dispense Refill Last Dose    insulin lispro 100 UNIT/ML Subcutaneous Solution Inject 30 Units into the skin daily with breakfast. 30 units breakfast, 30 units with lunch, and 40 units with dinner   5/12/2024    insulin glargine 100 UNIT/ML Subcutaneous Solution Inject 30 Units into the skin nightly.   5/12/2024    Prenatal Multivit-Min-Fe-FA (PRENATAL OR) Take by mouth.   Past Week    ASPIRIN 81 OR Take by mouth.   5/12/2024    Insulin Lispro, 1 Unit Dial, (HUMALOG KWIKPEN) 100 UNIT/ML Subcutaneous Solution Pen-injector Inject three times daily 28 units with breakfast, 30 units with lunch and 36 units with dinner. 30 mL 1     Insulin Pen Needle (BD PEN NEEDLE MALISSA U/F) 32G X 4 MM Does not apply Misc Inject 6 times daily (Patient not taking: Reported on 4/4/2024) 200 each 2     Continuous Blood Gluc Sensor (DEXCOM G7 SENSOR) Does  not apply Misc 1 each Every 10 days. Use as directed every 10 days (Patient not taking: Reported on 3/1/2024) 3 each 3     insulin glargine (LANTUS SOLOSTAR) 100 UNIT/ML Subcutaneous Solution Pen-injector Inject 14 Units into the skin nightly. (Patient not taking: Reported on 3/19/2024) 15 mL 1     Insulin Lispro, 1 Unit Dial, (HUMALOG KWIKPEN) 100 UNIT/ML Subcutaneous Solution Pen-injector Inject 10 units with breakfast, 8 units with lunch and 14 units with dinner. (Patient not taking: Reported on 4/4/2024) 15 mL 3     Continuous Blood Gluc Sensor (DEXCOM G7 SENSOR) Does not apply Misc 1 each Every 10 days. Use as directed every 10 days 3 each 5      Current Facility-Administered Medications Ordered in Epic   Medication Dose Route Frequency Provider Last Rate Last Admin    acetaminophen (Tylenol Extra Strength) tab 500 mg  500 mg Oral Q6H PRN Alina Scott MD        acetaminophen (Tylenol Extra Strength) tab 1,000 mg  1,000 mg Oral Q6H PRN Alina Scott MD        ibuprofen (Motrin) tab 600 mg  600 mg Oral Once PRN Alina Scott MD        ondansetron (Zofran) 4 MG/2ML injection 4 mg  4 mg Intravenous Q6H PRN Alina Scott MD        oxyTOCIN in sodium chloride 0.9% (Pitocin) 30 Units/500mL infusion premix  62.5-900 evy-units/min Intravenous Continuous Alina Scott MD        terbutaline (Brethine) 1 MG/ML injection 0.25 mg  0.25 mg Subcutaneous PRN Alina Scott MD        sodium citrate-citric acid (Bicitra) 500-334 MG/5ML oral solution 30 mL  30 mL Oral PRN Alina Scott MD        lidocaine PF (Xylocaine-MPF) 1% injection  30 mL Intradermal Once Alina Scott MD        lactated ringers infusion   Intravenous Continuous Alina Scott  mL/hr at 05/13/24 0916 New Bag at 05/13/24 0916    dextrose in lactated ringers 5% infusion   Intravenous PRN Alina Scott MD         lactated ringers IV bolus 500 mL  500 mL Intravenous PRN Alina Scott MD        nalbuphine (Nubain) 10 mg/mL injection 10 mg  10 mg Intramuscular Q6H PRN Alina Scott MD        And    hydrOXYzine 50 mg/mL injection 50 mg  50 mg Intramuscular Q6H PRN Alina Scott MD        fentaNYL (Sublimaze) 50 mcg/mL injection 50 mcg  50 mcg Intravenous Q30 Min PRN Alina Scott MD        misoprostol (CYTOTEC) partial tablets 25 mcg  25 mcg Vaginal Q4H Alina Scott MD   25 mcg at 05/13/24 1354    fentaNYL-bupivacaine 2 mcg/mL-0.125% in sodium chloride 0.9% 100 mL EPIDURAL infusion premix   Epidural Continuous Alina Scott MD        fentaNYL (Sublimaze) 50 mcg/mL injection 100 mcg  100 mcg Epidural Once Alina Scott MD        bupivacaine PF (Marcaine) 0.25% injection  20 mL Epidural Once Alina Scott MD        EPHEDrine (PF) 25 MG/5 ML injection 5 mg  5 mg Intravenous PRN Alina Scott MD        nalbuphine (Nubain) 10 mg/mL injection 2.5 mg  2.5 mg Intravenous Q15 Min PRN Alina Scott MD         No current [facility]-ordered outpatient medications on file.       Allergies   Allergen Reactions    Melons OTHER (SEE COMMENTS)     Itchy throat       Family History   Problem Relation Age of Onset    Asthma Mother     Diabetes Mother     Asthma Father     Asthma Sister     Breast Cancer Paternal Aunt 47    Cancer Paternal Aunt         unsure of primary    Glaucoma Neg     Macular degeneration Neg      Social History     Socioeconomic History    Marital status: Single   Occupational History    Occupation: Unemployed   Tobacco Use    Smoking status: Some Days     Current packs/day: 0.00     Types: Cigarettes     Last attempt to quit: 10/1/2020     Years since quitting: 3.6    Smokeless tobacco: Former   Vaping Use    Vaping status: Never Used   Substance and Sexual Activity    Alcohol use: Not  Currently     Comment: occasional    Drug use: Not Currently     Types: Cannabis     Comment: occasionally    Sexual activity: Yes     Partners: Male   Other Topics Concern    Blood Transfusions No       Available pre-op labs reviewed.  Lab Results   Component Value Date    WBC 9.1 05/13/2024    RBC 4.30 05/13/2024    HGB 12.2 05/13/2024    HCT 37.4 05/13/2024    MCV 87.0 05/13/2024    MCH 28.4 05/13/2024    MCHC 32.6 05/13/2024    RDW 13.8 05/13/2024    .0 05/13/2024     Lab Results   Component Value Date    PGLU 102 (H) 05/13/2024          Vital Signs:  Body mass index is 32.28 kg/m².   weight is 88 kg (194 lb). Her oral temperature is 98.5 °F (36.9 °C). Her blood pressure is 108/48 and her pulse is 89. Her respiration is 15 and oxygen saturation is 100%.   Vitals:    05/13/24 1809 05/13/24 1811 05/13/24 1814 05/13/24 1816   BP: 122/69 121/58 126/48 108/48   Pulse: 81 84 83 89   Resp:       Temp:       TempSrc:       SpO2:  99%  100%   Weight:            Anesthesia Evaluation     Patient summary reviewed and Nursing notes reviewed    No history of anesthetic complications   Airway   Mallampati: II  Neck ROM: full  Dental      Pulmonary - negative ROS and normal exam   Cardiovascular - negative ROS and normal exam    Neuro/Psych - negative ROS     GI/Hepatic/Renal - negative ROS     Endo/Other - negative ROS   Abdominal  - normal exam  (+) obese                 Anesthesia Plan:   ASA:  2  Plan:   Epidural  Informed Consent Plan and Risks Discussed With:  Patient      I have informed Kady Matthews and/or legal guardian or family member of the nature of the anesthetic plan, benefits, risks including possible dental damage if relevant, major complications, and any alternative forms of anesthetic management.   All of the patient's questions were answered to the best of my ability. The patient desires the anesthetic management as planned.  FRANCHESCA HARMAN MD  5/13/2024 6:25 PM  Present on Admission:    Pregnancy (HCC)

## 2024-05-13 NOTE — ANESTHESIA PROCEDURE NOTES
Labor Analgesia    Date/Time: 5/13/2024 6:00 PM    Performed by: David Langston MD  Authorized by: David Langston MD      General Information and Staff    Start Time:  5/13/2024 6:00 PM  End Time:  5/13/2024 6:10 PM  Anesthesiologist:  David Langston MD  Performed by:  Anesthesiologist  Patient Location:  OB  Reason for Block: labor epidural    Preanesthetic Checklist: patient identified, IV checked, site marked, risks and benefits discussed, monitors and equipment checked, pre-op evaluation, timeout performed, anesthesia consent and sterile technique used      Procedure Details    Patient Position:  Sitting  Prep: ChloraPrep    Monitoring:  Heart rate  Approach:  Midline    Epidural Needle    Injection Technique:  ANISHA air  Needle Type:  Tuohy  Needle Gauge:  18 G  Needle Length:  3.5 in  Location:  L3-4    Spinal Needle      Catheter    Catheter Type:  Multi-orifice  Catheter Size:  20 G  Test Dose:  Negative    Assessment      Additional Comments

## 2024-05-13 NOTE — PLAN OF CARE
Problem: BIRTH - VAGINAL/ SECTION  Goal: Fetal and maternal status remain reassuring during the birth process  Description: INTERVENTIONS:  - Monitor vital signs  - Monitor fetal heart rate  - Monitor uterine activity  - Monitor labor progression (vaginal delivery)  - DVT prophylaxis (C/S delivery)  - Surgical antibiotic prophylaxis (C/S delivery)  Outcome: Progressing     Problem: PAIN - ADULT  Goal: Verbalizes/displays adequate comfort level or patient's stated pain goal  Description: INTERVENTIONS:  - Encourage pt to monitor pain and request assistance  - Assess pain using appropriate pain scale  - Administer analgesics based on type and severity of pain and evaluate response  - Implement non-pharmacological measures as appropriate and evaluate response  - Consider cultural and social influences on pain and pain management  - Manage/alleviate anxiety  - Utilize distraction and/or relaxation techniques  - Monitor for opioid side effects  - Notify MD/LIP if interventions unsuccessful or patient reports new pain  - Anticipate increased pain with activity and pre-medicate as appropriate  Outcome: Progressing     Problem: ANXIETY  Goal: Will report anxiety at manageable levels  Description: INTERVENTIONS:  - Administer medication as ordered  - Teach and rehearse alternative coping skills  - Provide emotional support with 1:1 interaction with staff  Outcome: Progressing     Problem: Diabetes/Glucose Control  Goal: Glucose maintained within prescribed range  Description: INTERVENTIONS:  - Monitor Blood Glucose as ordered  - Assess for signs and symptoms of hyperglycemia and hypoglycemia  - Administer ordered medications to maintain glucose within target range  - Assess barriers to adequate nutritional intake and initiate nutrition consult as needed  - Instruct patient on self management of diabetes  Outcome: Progressing

## 2024-05-14 LAB
BASOPHILS # BLD AUTO: 0.03 X10(3) UL (ref 0–0.2)
BASOPHILS NFR BLD AUTO: 0.3 %
DEPRECATED RDW RBC AUTO: 42.7 FL (ref 35.1–46.3)
EOSINOPHIL # BLD AUTO: 0.17 X10(3) UL (ref 0–0.7)
EOSINOPHIL NFR BLD AUTO: 1.5 %
ERYTHROCYTE [DISTWIDTH] IN BLOOD BY AUTOMATED COUNT: 13.7 % (ref 11–15)
GLUCOSE BLDC GLUCOMTR-MCNC: 106 MG/DL (ref 70–99)
GLUCOSE BLDC GLUCOMTR-MCNC: 162 MG/DL (ref 70–99)
GLUCOSE BLDC GLUCOMTR-MCNC: 73 MG/DL (ref 70–99)
GLUCOSE BLDC GLUCOMTR-MCNC: 77 MG/DL (ref 70–99)
HCT VFR BLD AUTO: 32.8 %
HGB BLD-MCNC: 11.2 G/DL
IMM GRANULOCYTES # BLD AUTO: 0.03 X10(3) UL (ref 0–1)
IMM GRANULOCYTES NFR BLD: 0.3 %
LYMPHOCYTES # BLD AUTO: 1.6 X10(3) UL (ref 1–4)
LYMPHOCYTES NFR BLD AUTO: 14.3 %
MCH RBC QN AUTO: 29.6 PG (ref 26–34)
MCHC RBC AUTO-ENTMCNC: 34.1 G/DL (ref 31–37)
MCV RBC AUTO: 86.5 FL
MONOCYTES # BLD AUTO: 1.2 X10(3) UL (ref 0.1–1)
MONOCYTES NFR BLD AUTO: 10.7 %
NEUTROPHILS # BLD AUTO: 8.17 X10 (3) UL (ref 1.5–7.7)
NEUTROPHILS # BLD AUTO: 8.17 X10(3) UL (ref 1.5–7.7)
NEUTROPHILS NFR BLD AUTO: 72.9 %
PLATELET # BLD AUTO: 254 10(3)UL (ref 150–450)
RBC # BLD AUTO: 3.79 X10(6)UL
WBC # BLD AUTO: 11.2 X10(3) UL (ref 4–11)

## 2024-05-14 RX ORDER — DOCUSATE SODIUM 100 MG/1
100 CAPSULE, LIQUID FILLED ORAL 2 TIMES DAILY
Status: DISCONTINUED | OUTPATIENT
Start: 2024-05-15 | End: 2024-05-15

## 2024-05-14 RX ORDER — IBUPROFEN 600 MG/1
600 TABLET ORAL EVERY 6 HOURS PRN
Status: DISCONTINUED | OUTPATIENT
Start: 2024-05-14 | End: 2024-05-15

## 2024-05-14 NOTE — PROGRESS NOTES
[facility]  Obstetrics and Gynecology    OB/GYN: Postpartum Progress Note     SUBJECTIVE:  Patient is a 33 year old  female who is s/p . She is PPD# 1.   Doing well. Noted no complaints. Denies fever, chills, N, V, chest pain and SOB. Bleeding has been stable. Voiding without difficulty.  Passing flatus.  No Bm. Tolerating general diet. Ambulating without difficulty. .     OBJECTIVE:  Vitals:    24 2324 24 0310 24 0617 24 0740   BP: 109/61 100/68 106/66 101/63   Pulse: 73 60 60 65   Resp: 15 16 16 15   Temp: 97.5 °F (36.4 °C) 98.4 °F (36.9 °C) 98.6 °F (37 °C) 97.9 °F (36.6 °C)   TempSrc: Oral Oral Oral Oral   SpO2:       Weight:           Physical Exam:    General:    alert, appears stated age, and cooperative   Lochia:  appropriate   Uterine Fundus:   firm below umbilicus   Perineum:  healing well, no significant drainage, no dehiscence, no significant erythema    DVT Evaluation:  No evidence of DVT seen on physical exam.     Urinary output is adequate.     Labs:  Recent Labs   Lab 24  0540   RBC 3.79*   HGB 11.2*   HCT 32.8*   MCV 86.5   MCH 29.6   MCHC 34.1   RDW 13.7   NEPRELIM 8.17*   WBC 11.2*   .0       ASSESSMENT/PLAN:  Patient is a 33 year old  female who is s/p  PPD# 1.     Doing well   Continue routine postpartum care  Vitals per routine   Encourage ambulation     Type 2 DM  - endocrine managing, glucose this AM 73.        DO DIALLO Rangel OBGYN

## 2024-05-14 NOTE — PLAN OF CARE
Problem: Patient Centered Care  Goal: Patient preferences are identified and integrated in the patient's plan of care  Description: Interventions:  - What would you like us to know as we care for you?    - Provide timely, complete, and accurate information to patient/family  - Incorporate patient and family knowledge, values, beliefs, and cultural backgrounds into the planning and delivery of care  - Encourage patient/family to participate in care and decision-making at the level they choose  - Honor patient and family perspectives and choices  Outcome: Progressing     Problem: Patient/Family Goals  Goal: Patient/Family Long Term Goal  Description: Patient's Long Term Goal:      Interventions:  -    - See additional Care Plan goals for specific interventions  Outcome: Progressing  Goal: Patient/Family Short Term Goal  Description: Patient's Short Term Goal:      Interventions:   -    - See additional Care Plan goals for specific interventions  Outcome: Progressing     Problem: BIRTH - VAGINAL/ SECTION  Goal: Fetal and maternal status remain reassuring during the birth process  Description: INTERVENTIONS:  - Monitor vital signs  - Monitor fetal heart rate  - Monitor uterine activity  - Monitor labor progression (vaginal delivery)  - DVT prophylaxis (C/S delivery)  - Surgical antibiotic prophylaxis (C/S delivery)  Outcome: Progressing     Problem: PAIN - ADULT  Goal: Verbalizes/displays adequate comfort level or patient's stated pain goal  Description: INTERVENTIONS:  - Encourage pt to monitor pain and request assistance  - Assess pain using appropriate pain scale  - Administer analgesics based on type and severity of pain and evaluate response  - Implement non-pharmacological measures as appropriate and evaluate response  - Consider cultural and social influences on pain and pain management  - Manage/alleviate anxiety  - Utilize distraction and/or relaxation techniques  - Monitor for opioid side effects  -  Notify MD/LIP if interventions unsuccessful or patient reports new pain  - Anticipate increased pain with activity and pre-medicate as appropriate  Outcome: Progressing     Problem: ANXIETY  Goal: Will report anxiety at manageable levels  Description: INTERVENTIONS:  - Administer medication as ordered  - Teach and rehearse alternative coping skills  - Provide emotional support with 1:1 interaction with staff  Outcome: Progressing     Problem: Diabetes/Glucose Control  Goal: Glucose maintained within prescribed range  Description: INTERVENTIONS:  - Monitor Blood Glucose as ordered  - Assess for signs and symptoms of hyperglycemia and hypoglycemia  - Administer ordered medications to maintain glucose within target range  - Assess barriers to adequate nutritional intake and initiate nutrition consult as needed  - Instruct patient on self management of diabetes  Outcome: Progressing     Problem: POSTPARTUM  Goal: Long Term Goal:Experiences normal postpartum course  Description: INTERVENTIONS:  - Assess and monitor vital signs and lab values.  - Assess fundus and lochia.  - Provide ice/sitz baths for perineum discomfort.  - Monitor healing of incision/episiotomy/laceration, and assess for signs and symptoms of infection and hematoma.  - Assess bladder function and monitor for bladder distention.  - Provide/instruct/assist with pericare as needed.  - Provide VTE prophylaxis as needed.  - Monitor bowel function.  - Encourage ambulation and provide assistance as needed.  - Assess and monitor emotional status and provide social service/psych resources as needed.  - Utilize standard precautions and use personal protective equipment as indicated. Ensure aseptic care of all intravenous lines and invasive tubes/drains.  - Obtain immunization and exposure to communicable diseases history.  Outcome: Progressing  Goal: Optimize infant feeding at the breast  Description: INTERVENTIONS:  - Initiate breast feeding within first hour  after birth.   - Monitor effectiveness of current breast feeding efforts.  - Assess support systems available to mother/family.  - Identify cultural beliefs/practices regarding lactation, letdown techniques, maternal food preferences.  - Assess mother's knowledge and previous experience with breast feeding.  - Provide information as needed about early infant feeding cues (e.g., rooting, lip smacking, sucking fingers/hand) versus late cue of crying.  - Discuss/demonstrate breast feeding aids (e.g., infant sling, nursing footstool/pillows, and breast pumps).  - Encourage mother/other family members to express feelings/concerns, and actively listen.  - Educate father/SO about benefits of breast feeding and how to manage common lactation challenges.  - Recommend avoidance of specific medications or substances incompatible with breast feeding.  - Assess and monitor for signs of nipple pain/trauma.  - Instruct and provide assistance with proper latch.  - Review techniques for milk expression (breast pumping) and storage of breast milk. Provide pumping equipment/supplies, instructions and assistance, as needed.  - Encourage rooming-in and breast feeding on demand.  - Encourage skin-to-skin contact.  - Provide LC support as needed.  - Assess for and manage engorgement.  - Provide breast feeding education handouts and information on community breast feeding support.   Outcome: Progressing  Goal: Establishment of adequate milk supply with medication/procedure interruptions  Description: INTERVENTIONS:  - Review techniques for milk expression (breast pumping).   - Provide pumping equipment/supplies, instructions, and assistance until it is safe to breastfeed infant.  Outcome: Progressing  Goal: Experiences normal breast weaning course  Description: INTERVENTIONS:  - Assess for and manage engorgement.  - Instruct on breast care.  - Provide comfort measures.  Outcome: Progressing  Goal: Appropriate maternal -   bonding  Description: INTERVENTIONS:  - Assess caregiver- interactions.  - Assess caregiver's emotional status and coping mechanisms.  - Encourage caregiver to participate in  daily care.  - Assess support systems available to mother/family.  - Provide /case management support as needed.  Outcome: Progressing

## 2024-05-14 NOTE — PROGRESS NOTES
Patient arrived to postpartum unit, room 356, via wheelchair and accompanied by staff and significant other. Report received from labor and delivery RN Nataliia. ID bands were compared and verified upon assessment. Patients are in stable condition

## 2024-05-14 NOTE — PLAN OF CARE
Dr. Richardson notified for blood sugar of 72mg/dl prior to breakfast this morning. Patient asymptomatic. Patient endorses she took metformin for diabetes management prior to pregnancy. This RN with orders to check blood sugars before meals and at bedtime.    Problem: Diabetes/Glucose Control  Goal: Glucose maintained within prescribed range  Description: INTERVENTIONS:  - Monitor Blood Glucose as ordered  - Assess for signs and symptoms of hyperglycemia and hypoglycemia  - Administer ordered medications to maintain glucose within target range  - Assess barriers to adequate nutritional intake and initiate nutrition consult as needed  - Instruct patient on self management of diabetes  Outcome: Progressing    Problem: POSTPARTUM  Goal: Long Term Goal:Experiences normal postpartum course  Description: INTERVENTIONS:  - Assess and monitor vital signs and lab values.  - Assess fundus and lochia.  - Provide ice/sitz baths for perineum discomfort.  - Monitor healing of incision/episiotomy/laceration, and assess for signs and symptoms of infection and hematoma.  - Assess bladder function and monitor for bladder distention.  - Provide/instruct/assist with pericare as needed.  - Provide VTE prophylaxis as needed.  - Monitor bowel function.  - Encourage ambulation and provide assistance as needed.  - Assess and monitor emotional status and provide social service/psych resources as needed.  - Utilize standard precautions and use personal protective equipment as indicated. Ensure aseptic care of all intravenous lines and invasive tubes/drains.  - Obtain immunization and exposure to communicable diseases history.  Outcome: Progressing  Goal: Optimize infant feeding at the breast  Description: INTERVENTIONS:  - Initiate breast feeding within first hour after birth.   - Monitor effectiveness of current breast feeding efforts.  - Assess support systems available to mother/family.  - Identify cultural beliefs/practices regarding  lactation, letdown techniques, maternal food preferences.  - Assess mother's knowledge and previous experience with breast feeding.  - Provide information as needed about early infant feeding cues (e.g., rooting, lip smacking, sucking fingers/hand) versus late cue of crying.  - Discuss/demonstrate breast feeding aids (e.g., infant sling, nursing footstool/pillows, and breast pumps).  - Encourage mother/other family members to express feelings/concerns, and actively listen.  - Educate father/SO about benefits of breast feeding and how to manage common lactation challenges.  - Recommend avoidance of specific medications or substances incompatible with breast feeding.  - Assess and monitor for signs of nipple pain/trauma.  - Instruct and provide assistance with proper latch.  - Review techniques for milk expression (breast pumping) and storage of breast milk. Provide pumping equipment/supplies, instructions and assistance, as needed.  - Encourage rooming-in and breast feeding on demand.  - Encourage skin-to-skin contact.  - Provide LC support as needed.  - Assess for and manage engorgement.  - Provide breast feeding education handouts and information on community breast feeding support.   Outcome: Progressing  Goal: Establishment of adequate milk supply with medication/procedure interruptions  Description: INTERVENTIONS:  - Review techniques for milk expression (breast pumping).   - Provide pumping equipment/supplies, instructions, and assistance until it is safe to breastfeed infant.  Outcome: Progressing  Goal: Appropriate maternal -  bonding  Description: INTERVENTIONS:  - Assess caregiver- interactions.  - Assess caregiver's emotional status and coping mechanisms.  - Encourage caregiver to participate in  daily care.  - Assess support systems available to mother/family.  - Provide /case management support as needed.  Outcome: Progressing
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
## 2024-05-14 NOTE — PROGRESS NOTES
Patient up to bathroom with assist x 2.  Patient transferred to mother/baby room 356 per wheelchair in stable condition with baby and personal belongings.  Accompanied by significant other and staff.  Report given to Brandi mother/baby RN.

## 2024-05-15 VITALS
BODY MASS INDEX: 32 KG/M2 | HEART RATE: 66 BPM | SYSTOLIC BLOOD PRESSURE: 113 MMHG | TEMPERATURE: 98 F | RESPIRATION RATE: 15 BRPM | OXYGEN SATURATION: 100 % | DIASTOLIC BLOOD PRESSURE: 76 MMHG | WEIGHT: 194 LBS

## 2024-05-15 LAB
GLUCOSE BLDC GLUCOMTR-MCNC: 111 MG/DL (ref 70–99)
GLUCOSE BLDC GLUCOMTR-MCNC: 89 MG/DL (ref 70–99)

## 2024-05-15 RX ORDER — IBUPROFEN 600 MG/1
600 TABLET ORAL EVERY 6 HOURS PRN
Qty: 40 TABLET | Refills: 0 | Status: SHIPPED | OUTPATIENT
Start: 2024-05-15

## 2024-05-15 RX ORDER — ACETAMINOPHEN 500 MG
1000 TABLET ORAL EVERY 6 HOURS PRN
Qty: 40 TABLET | Refills: 0 | Status: SHIPPED | OUTPATIENT
Start: 2024-05-15

## 2024-05-15 NOTE — DISCHARGE SUMMARY
[facility]  part of [facility]    Discharge Summary    Kady Matthews Patient Status:  Inpatient    1990 MRN [id number]   Location [facility] 3SE Attending Alina Scott*   Hosp Day # 2 PCP Amy Campa MD     Date of Admission: 2024    Date of Discharge: 05/15/24     Admission Diagnoses: pregnancy  Pregnancy (HCC) at 38w2d     Secondary Diagnosis: preexisting DMA2, obesity in pregnancy    Primary OB Clinician: [address] Course:     EDC: Estimated Date of Delivery: 24    Gestational Age: 38w2d    Date of Delivery: 24    Antepartum complications: preexisting DM, obesity in pregnancy    Delivered By: Dr. Alina Jimenez      Delivery Type:       Tubal Ligation: n/a    Baby: Liveborn female,     Apgars:  1 minute:   9                 5 minutes: 9                        10 minutes:      Anesthesia: epidural    Consultants         Provider   Role Specialty     Jenifer Richardson MD      Consulting Physician ENDOCRINOLOGY     Tabatha Lynch MD      Consulting Physician Maternal Fetal Medicine     Molyl Prakash MD      Consulting Physician Maternal Fetal Medicine              Intrapartum Complications: None    Laceration: none    Episiotomy: none    Placenta: spontaneous    Feeding Method: breast fed    Rh Immune Globulin Given: no    Rubella Vaccine Given: no        Discharge Plan:   Discharge Condition: Good  Early Discharge:  NO    Discharge medications:  Current Discharge Medication List        New Orders    Details   acetaminophen 500 MG Oral Tab Take 2 tablets (1,000 mg total) by mouth every 6 (six) hours as needed.      ibuprofen 600 MG Oral Tab Take 1 tablet (600 mg total) by mouth every 6 (six) hours as needed for Fever.           Home Meds - Unchanged    Details   Prenatal Multivit-Min-Fe-FA (PRENATAL OR) Take by mouth.      Insulin Pen Needle (BD PEN NEEDLE MALISSA U/F) 32G X 4 MM Does not apply Misc Inject 6 times daily       !! Continuous Blood Gluc Sensor (DEXCOM G7 SENSOR) Does not apply Misc 1 each Every 10 days. Use as directed every 10 days      !! Continuous Blood Gluc Sensor (DEXCOM G7 SENSOR) Does not apply Misc 1 each Every 10 days. Use as directed every 10 days       !! - Potential duplicate medications found. Please discuss with provider.            Follow up with Endocrinology for DM managment        Discharge Diet: As tolerated    Discharge Activity: Pelvic rest until cleared    Follow up:      Follow-up Information       [facility]. Call.    Specialty: Pediatrics  Why: As needed  Contact information:  [address]  [facility] [address]  [phone]  Additional information:  Masks are optional for all patients and visitors, unless otherwise indicated.             Alina Scott MD. Go on 2024.    Specialty: OBSTETRICS & GYNECOLOGY  Why: postpartum visit  Contact information:  [address]  [facility] [address] [phone]                             Follow up Labs: None        Other Discharge Instructions:         [facility] has great support for our families even after discharge.  We have virtual or in-person support groups.  Visit our website for the most up-to-date info for our many different support groups. [email or web address]       Outpatient Lactation appoints.  Call [phone]- to schedule an appt.  Our office is located in the Maternal Fetal Medicine office next to [facility] on the first floor.      Moms Support Groups  Our weekly New Mom Support Groups are for any new parents in our community. They are led by an experienced Mother/Baby nurse or IBCLC and usually include a guest speaker on a topic of interest to new parents. These in-person groups also include Breastfeeding Support at each meeting. Bring your baby ( - 6 months) with you! Moms-to-be are also welcome! All mom's  welcome even if its not your first.     MOM & BABY HOUR   Meets most  10:00 - 11:30 a.m.  Masks are not required, but be considerate of others and do not attend if mom or baby have had any symptoms of illness within the previous 24 hours. Breastfeeding support will be included at each session--just ask the leader any breastfeeding questions you may have. Location [facility] [place] [address] [place] Go inside the front door and to the right to the “Community Education Room”.    Mom's Line: [phone]  A phone line dedicated for women (or anyone worried about a women) who may be experiencing signs or symptoms of postpartum depression, anxiety, or overwhelmed with new baby. Call - answered by live trained mental health professionals, free and confidential, emotional support, referrals, in any language.    Nurturing Mom- A support group for new and expectant moms looking for support with the transition to parenthood as well as those experiencing symptoms of  anxiety and/or depression.  Please contact [email or web address] if you need directions or the link for the virtual meetings. Please contact [email or web address] if you plan to attend, but please be considerate of others and do not attend if mom or baby have had any symptoms of illness within the previous 24 hours.     La Leche League for breast feeding and parent support, Website: IIIus.org  and for the [place] group and other groups visit https://www.facebook.com/pg/Ben/events/.  to help find a group, all meetings are virtual.     Facebook groups-  for more support when home- Babies & Mommies of [facility] --- you can find mom-to-mom advice and the list of speaker topics for cradle talk program.  Telephone Support  Postpartum depression [place] of [place] (www.ppdil.org)  -Free information and support for pregnant and postpartum women with symptoms of depression, anxiety  -Mom-to-mom  support from volunteers who have been through depression    Telephone support: ([phone]  Urgent [phone])-[phone] (answered 24/7)  Email support: support@ppdil.org    Postpartum Support International (www.postpartum.net)  -Free phone conversation with trained volunteers   (804) [phone]; after the prompt enter 63530#    National Postpartum Depression Hotline  (374)-PPD-MOMS    Helpful websites:    www.[employer]  www.[employer]  www.Breastfeed[place].org    Initiation of breast pumping after discharge:     - Add 1 pumping session a day, additional to the infant's feedings, 2-3 weeks after delivery.     - Pump both breasts for 15 mins, immediately after a breast feeding session.    - Pumping first thing in the morning will provide greater output.    - If you chose to pump more than once a day, you should be consistent every day to prevent a breast infection.      - Pump using an electric pump over a hands free pump (electric pumps provided stronger stimulation to the nipples).    - Store the breast milk in 2-3 oz containers.     - Label containers with date and time.    - Always feed oldest milk first.                 Post Vaginal Delivery Home Care Instructions     We hope you were pleased with your care at [facility].  We wish you the best outcome and overall experience with the delivery of your baby.  These instructions will help to minimize pain, limit the risk for an infection, and improve the likelihood of a successful recovery.    What to Expect:  Abdominal cramping after delivery especially if you are breastfeeding.   Vaginal bleeding for about 4-6 weeks that may be followed by a yellow or white discharge for a few more weeks.  Your period will resume in approximately 6-8 weeks, unless you are breastfeeding.    If you are bottle feeding, you may notice breast engorgement in about 3 days.  Your breast may be sore and hard. Please wear a tight fitted bra or sports bra for 24-36 hours to  help prevent your breast from producing milk, and use ice packs to relive any discomfort.  If you are breastfeeding, nipple dryness is very common the first few days.    Constipation is common after having a baby.  Please increase fluid and fiber in your diet.      Over-The-Counter Medication  Non-prescription anti-inflammatory medications can also help to ease the pain.  You may take Aleve, Tylenol or Ibuprofen   Colace or Metamucil for Constipation  Lanolin for dry nipples  Tucks, Witch Hazel and Epifoam for vaginal/perineum discomfort.   Drink a full glass of water with oral medication and take as directed.    Wound Care  The following instructions will promote proper healing and help to prevent infection  Vaginal/perineum Care: Sitz Bath for 15mins, 2-3 times a day,    Bathing/Showers  You may resume showers  No baths, swimming, hot tubs until your post-partum visit    Home Medication  Resume your home medications as instructed    Diet  Resume your normal diet    Activity  Refrain from vaginal intercourse, vaginal suppositories, tampon use or douches until after your post-partum visit.  No exercising for 4 weeks  You may climb stairs minimally for the 1st week.    Do not do heavy housework for at least 2-3 weeks    Return to Work or School  You may return to work in approximately 6 weeks  Contact your obstetrician’s office, if you need a medical release. ([phone])    Driving  Avoid driving if you are taking narcotics for pain relief.    Follow-up Appointment with Your Obstetrician  Call your obstetrician’s office today for an appointment in 4-6 weeks.    The number is [phone].  Verify your appointment date, day, time, and location.  At your 1st post-partum office visit:  Your progress will be evaluated, findings reviewed, and any additional concerns and instructions will be discussed.    Questions or Concerns  Call your obstetrician’s office if you experience the following:  Severe pain not controlled  by pain medication  Foul smelling vaginal discharge  Heavy bleeding  Shortness of breath  Fever  Redness, increased swelling or drainage from your incision  Crying and periods of sadness that prevents you from caring for yourself and your baby  Burning sensation during urination or inability to urinate  Swelling, redness or abnormal warmth to your leg/calf  Please call ([phone]). If your call is made after office hours, a physician will be available to help you.  There is always a provider covering our patients.    Thank you for coming to [facility] to start your new family.  The nurses and the anesthesiologists try very hard to make sure you receive the best care possible.  Your trust in them as well as us is greatly appreciated.    Thanks so much,   The Providers of [facility] Obstetrics and Gynecology    Referrals For Individual Therapy:    [facility] group-multiple locations  Phone: [phone]    [facility]   [address]  [place] [address]  [phone]    [facility]  [address], [place] [address] [phone]            Donya Romero MD    [facility] 10 OBGYN

## 2024-05-15 NOTE — LACTATION NOTE
LACTATION NOTE - MOTHER      Evaluation Type: Inpatient    Problems identified  Problems identified: Knowledge deficit;Milk supply not WNL  Milk supply not WNL: Reduced (potential)  Problems Identified Other: started supplementing    Maternal history  Maternal history: Diabetes Mellitus    Breastfeeding goal  Breastfeeding goal: To maintain breast milk feeding per patient goal    Maternal Assessment  Bilateral Breasts: Soft;Symmetrical  Bilateral Nipples: WNL  Prior breastfeeding experience (comment below): Multip;Successful  Breastfeeding Assistance: Breastfeeding assistance provided with permission    Pain assessment  Location/Comment: denies  Treatment of Sore Nipples: Lanolin    Guidelines for use of:  Breast pump type: Ameda Platinum  Suggested use of pump: Pump each time a supplement is offered              Mother was breastfeeding as I entered the room. Deep latch observed. Made minor positioning suggestions. Encouraged STS. Discussed hand expression and spoon feeding if the infant is too sleepy to nurse. Discussed normal NB behavior. Educated patient about supply/demand and the importance of frequent stimulation. Encouraged to call LC if assistance with breastfeeding is needed.

## 2024-05-15 NOTE — PROGRESS NOTES
RENETTA PPD SCREENING    Reason for Referral: EPDS =14    Current Stressors:    History of PPD: Pt denies hx of PPD.    Financial: Pt denies financial stressors.    Other: Pt reports the last few weeks of her pregnancy were overwhelming emotionally. She endorsed increased stress and anxiety. Pt reports a history of anxiety and depression. No current psychiatrist or therapist. No current psych meds. Pt denies hx of psychiatric hospitalizations.    Mental Health Status:    Current Symptoms: Calm and cooperative   Appearance/General Behavior: No abnormalities in appearance or general behavior.    General Cognitive Functioning: *   Thought Process: Linear and logical    Thought Content: Pt denies AVH   Mood/Affect: Euthymic; congruent w mood    Orientation: AOX4    Speech: Normal rate, rhythm, volume    Homicidal/Suicidal Ideation/Plan: Pt denies SI/HI    Living Arrangement:    Who Resides at Home: Pt lives with FOB and 2 children   Has other Children: 2 children   Is Father of the Baby involved: Yes    Has Familial Support: Yes    Has Other Support Network: Pt identified having large support network.     Plan:    Provide PPD Information:     Postpartum Depression Resources Given: Yes    Cradle Talk Information Given: Yes    Depression During and After Pregnancy Information given: Yes   Provide RENET[facility] Contact Information: Yes   Other Information Given: Pt declined additional referrals.      PANTERA Torres  [id number]

## 2024-05-15 NOTE — PLAN OF CARE
Discharge order received from MD.  Postpartum folder given, discharge medication form reviewed, signed and given to patient. ID Band matched with baby band. Patient informed when to make a follow-up appointment with OB. Mother is interacting appropriately with baby. Verbalized understanding of follow-up instructions. Discharged in stable condition via wheelchair.

## 2024-05-15 NOTE — DISCHARGE INSTRUCTIONS
[facility] has great support for our families even after discharge.  We have virtual or in-person support groups.  Visit our website for the most up-to-date info for our many different support groups. [email or web address]       Outpatient Lactation appoints.  Call [phone]- to schedule an appt.  Our office is located in the Maternal Fetal Medicine office next to [facility] on the first floor.      Moms Support Groups  Our weekly New Mom Support Groups are for any new parents in our community. They are led by an experienced Mother/Baby nurse or IBCLC and usually include a guest speaker on a topic of interest to new parents. These in-person groups also include Breastfeeding Support at each meeting. Bring your baby ( - 6 months) with you! Moms-to-be are also welcome! All mom's welcome even if its not your first.     MOM & BABY HOUR   Meets most  10:00 - 11:30 a.m.  Masks are not required, but be considerate of others and do not attend if mom or baby have had any symptoms of illness within the previous 24 hours. Breastfeeding support will be included at each session--just ask the leader any breastfeeding questions you may have. Location [facility] [place] [address] [place] Go inside the front door and to the right to the “Community Education Room”.    Mom's Line: [phone]  A phone line dedicated for women (or anyone worried about a women) who may be experiencing signs or symptoms of postpartum depression, anxiety, or overwhelmed with new baby. Call - answered by live trained mental health professionals, free and confidential, emotional support, referrals, in any language.    Nurturing Mom- A support group for new and expectant moms looking for support with the transition to parenthood as well as those experiencing symptoms of  anxiety and/or depression.  Please contact [email or web address] if you need  directions or the link for the virtual meetings. Please contact [email or web address] if you plan to attend, but please be considerate of others and do not attend if mom or baby have had any symptoms of illness within the previous 24 hours.     La Leche League for breast feeding and parent support, Website: IIIus.org  and for the [place] group and other groups visit https://www.yourdelivery.com/pg/Ben/events/.  to help find a group, all meetings are virtual.     Facebook groups-  for more support when home- Babies & Mommies [facility] --- you can find mom-to-mom advice and the list of speaker topics for cradle talk program.  Telephone Support  Postpartum depression [place] of [place] (www.ppdil.org)  -Free information and support for pregnant and postpartum women with symptoms of depression, anxiety  -Mom-to-mom support from volunteers who have been through depression    Telephone support: ([phone]  Urgent support:(543)-498-9039 (answered 24/7)  Email support: support@ppdil.org    Postpartum Support International (www.postpartum.net)  -Free phone conversation with trained volunteers   (194) 715-8178; after the prompt enter 83369#    National Postpartum Depression Hotline  (046)-PPD-MOMS    Helpful websites:    www.llli.org  www.[employer]  www.Breastfeed[place].org    Initiation of breast pumping after discharge:     - Add 1 pumping session a day, additional to the infant's feedings, 2-3 weeks after delivery.     - Pump both breasts for 15 mins, immediately after a breast feeding session.    - Pumping first thing in the morning will provide greater output.    - If you chose to pump more than once a day, you should be consistent every day to prevent a breast infection.      - Pump using an electric pump over a hands free pump (electric pumps provided stronger stimulation to the nipples).    - Store the breast milk in 2-3 oz containers.     - Label containers with date and time.    - Always feed  oldest milk first.                 Post Vaginal Delivery Home Care Instructions     We hope you were pleased with your care at [facility].  We wish you the best outcome and overall experience with the delivery of your baby.  These instructions will help to minimize pain, limit the risk for an infection, and improve the likelihood of a successful recovery.    What to Expect:  Abdominal cramping after delivery especially if you are breastfeeding.   Vaginal bleeding for about 4-6 weeks that may be followed by a yellow or white discharge for a few more weeks.  Your period will resume in approximately 6-8 weeks, unless you are breastfeeding.    If you are bottle feeding, you may notice breast engorgement in about 3 days.  Your breast may be sore and hard. Please wear a tight fitted bra or sports bra for 24-36 hours to help prevent your breast from producing milk, and use ice packs to relive any discomfort.  If you are breastfeeding, nipple dryness is very common the first few days.    Constipation is common after having a baby.  Please increase fluid and fiber in your diet.      Over-The-Counter Medication  Non-prescription anti-inflammatory medications can also help to ease the pain.  You may take Aleve, Tylenol or Ibuprofen   Colace or Metamucil for Constipation  Lanolin for dry nipples  Tucks, Witch Hazel and Epifoam for vaginal/perineum discomfort.   Drink a full glass of water with oral medication and take as directed.    Wound Care  The following instructions will promote proper healing and help to prevent infection  Vaginal/perineum Care: Sitz Bath for 15mins, 2-3 times a day,    Bathing/Showers  You may resume showers  No baths, swimming, hot tubs until your post-partum visit    Home Medication  Resume your home medications as instructed    Diet  Resume your normal diet    Activity  Refrain from vaginal intercourse, vaginal suppositories, tampon use or douches until after your post-partum  visit.  No exercising for 4 weeks  You may climb stairs minimally for the 1st week.    Do not do heavy housework for at least 2-3 weeks    Return to Work or School  You may return to work in approximately 6 weeks  Contact your obstetrician’s office, if you need a medical release. ([phone])    Driving  Avoid driving if you are taking narcotics for pain relief.    Follow-up Appointment with Your Obstetrician  Call your obstetrician’s office today for an appointment in 4-6 weeks.    The number is [phone].  Verify your appointment date, day, time, and location.  At your 1st post-partum office visit:  Your progress will be evaluated, findings reviewed, and any additional concerns and instructions will be discussed.    Questions or Concerns  Call your obstetrician’s office if you experience the following:  Severe pain not controlled by pain medication  Foul smelling vaginal discharge  Heavy bleeding  Shortness of breath  Fever  Redness, increased swelling or drainage from your incision  Crying and periods of sadness that prevents you from caring for yourself and your baby  Burning sensation during urination or inability to urinate  Swelling, redness or abnormal warmth to your leg/calf  Please call ([phone]). If your call is made after office hours, a physician will be available to help you.  There is always a provider covering our patients.    Thank you for coming to [facility] to start your new family.  The nurses and the anesthesiologists try very hard to make sure you receive the best care possible.  Your trust in them as well as us is greatly appreciated.    Thanks so much,   The Providers of [facility] Obstetrics and Gynecology    Referrals For Individual Therapy:    [facility] group-multiple locations  Phone: [phone]    [facility]   [address]  [place] [address]  [phone]    [facility]  [address],  [place] [address]  [phone]

## 2024-05-15 NOTE — PLAN OF CARE
Problem: Patient Centered Care  Goal: Patient preferences are identified and integrated in the patient's plan of care  Description: Interventions:  - What would you like us to know as we care for you?   - Provide timely, complete, and accurate information to patient/family  - Incorporate patient and family knowledge, values, beliefs, and cultural backgrounds into the planning and delivery of care  - Encourage patient/family to participate in care and decision-making at the level they choose  - Honor patient and family perspectives and choices  Outcome: Completed     Problem: Patient/Family Goals  Goal: Patient/Family Long Term Goal  Description: Patient's Long Term Goal:     Interventions:  -   - See additional Care Plan goals for specific interventions  Outcome: Completed  Goal: Patient/Family Short Term Goal  Description: Patient's Short Term Goal:     Interventions:   -   - See additional Care Plan goals for specific interventions  Outcome: Completed     Problem: Diabetes/Glucose Control  Goal: Glucose maintained within prescribed range  Description: INTERVENTIONS:  - Monitor Blood Glucose as ordered  - Assess for signs and symptoms of hyperglycemia and hypoglycemia  - Administer ordered medications to maintain glucose within target range  - Assess barriers to adequate nutritional intake and initiate nutrition consult as needed  - Instruct patient on self management of diabetes  Outcome: Completed     Problem: POSTPARTUM  Goal: Long Term Goal:Experiences normal postpartum course  Description: INTERVENTIONS:  - Assess and monitor vital signs and lab values.  - Assess fundus and lochia.  - Provide ice/sitz baths for perineum discomfort.  - Monitor healing of incision/episiotomy/laceration, and assess for signs and symptoms of infection and hematoma.  - Assess bladder function and monitor for bladder distention.  - Provide/instruct/assist with pericare as needed.  - Provide VTE prophylaxis as needed.  - Monitor  bowel function.  - Encourage ambulation and provide assistance as needed.  - Assess and monitor emotional status and provide social service/psych resources as needed.  - Utilize standard precautions and use personal protective equipment as indicated. Ensure aseptic care of all intravenous lines and invasive tubes/drains.  - Obtain immunization and exposure to communicable diseases history.  Outcome: Completed  Goal: Optimize infant feeding at the breast  Description: INTERVENTIONS:  - Initiate breast feeding within first hour after birth.   - Monitor effectiveness of current breast feeding efforts.  - Assess support systems available to mother/family.  - Identify cultural beliefs/practices regarding lactation, letdown techniques, maternal food preferences.  - Assess mother's knowledge and previous experience with breast feeding.  - Provide information as needed about early infant feeding cues (e.g., rooting, lip smacking, sucking fingers/hand) versus late cue of crying.  - Discuss/demonstrate breast feeding aids (e.g., infant sling, nursing footstool/pillows, and breast pumps).  - Encourage mother/other family members to express feelings/concerns, and actively listen.  - Educate father/SO about benefits of breast feeding and how to manage common lactation challenges.  - Recommend avoidance of specific medications or substances incompatible with breast feeding.  - Assess and monitor for signs of nipple pain/trauma.  - Instruct and provide assistance with proper latch.  - Review techniques for milk expression (breast pumping) and storage of breast milk. Provide pumping equipment/supplies, instructions and assistance, as needed.  - Encourage rooming-in and breast feeding on demand.  - Encourage skin-to-skin contact.  - Provide LC support as needed.  - Assess for and manage engorgement.  - Provide breast feeding education handouts and information on community breast feeding support.   Outcome: Completed  Goal:  Establishment of adequate milk supply with medication/procedure interruptions  Description: INTERVENTIONS:  - Review techniques for milk expression (breast pumping).   - Provide pumping equipment/supplies, instructions, and assistance until it is safe to breastfeed infant.  Outcome: Completed  Goal: Experiences normal breast weaning course  Description: INTERVENTIONS:  - Assess for and manage engorgement.  - Instruct on breast care.  - Provide comfort measures.  Outcome: Completed  Goal: Appropriate maternal -  bonding  Description: INTERVENTIONS:  - Assess caregiver- interactions.  - Assess caregiver's emotional status and coping mechanisms.  - Encourage caregiver to participate in  daily care.  - Assess support systems available to mother/family.  - Provide /case management support as needed.  Outcome: Completed     Problem: COPING  Goal: Pt/Family able to verbalize concerns and demonstrate effective coping strategies  Description: INTERVENTIONS:  - Assist patient/family to identify coping skills, available support systems and cultural and spiritual values  - Provide emotional support, including active listening and acknowledgement of concerns of patient and caregivers  - Reduce environmental stimuli, as able  - Instruct patient/family in relaxation techniques, as appropriate  - Assess for spiritual and psychosocial needs and initiate Spiritual Care or Behavioral Health consult as needed  Outcome: Completed

## 2024-05-15 NOTE — LACTATION NOTE
This note was copied from a baby's chart.  LACTATION NOTE - INFANT    Evaluation Type  Evaluation Type: Inpatient    Problems & Assessment  Problems Diagnosed or Identified: 37-38 weeks gestation  Infant Assessment: Hunger cues present  Muscle tone: Appropriate for GA    Feeding Assessment  Summary Current Feeding: Breastfeeding with formula supplement  Breastfeeding Assessment: Assisted with breastfeeding w/mother's permission;Calm and ready to breastfeed;Coordinated suck/swallow  Breastfeeding Positions: cradle;cross cradle;right breast  Latch: Repeated attempts, hold nipple in mouth, stimulate to suck  Audible Sucks/Swallows: A few with stimulation  Type of Nipple: Everted (after stimulation)  Comfort (Breast/Nipple): Filling, red/small blisters/bruises, mild/mod discomfort  Hold (Positioning): No assist from staff, mother able to position/hold infant  LATCH Score: 7

## 2024-05-15 NOTE — PROGRESS NOTES
[facility]  Obstetrics and Gynecology    OB/GYN: Postpartum Progress Note     SUBJECTIVE:  Patient is a 33 year old  female who is s/p . She is PPD# 2   Doing well. Noted no complaints. Denies fever, chills, N, V, chest pain and SOB. Bleeding has been stable. Voiding without difficulty.  Passing flatus.  No Bm. Tolerating general diet. Ambulating without difficulty. .     OBJECTIVE:  Vitals:    24 0310 24 0617 24 0740 24   BP: 100/68 106/66 101/63 112/69   Pulse: 60 60 65 73   Resp: 16 16 15 16   Temp: 98.4 °F (36.9 °C) 98.6 °F (37 °C) 97.9 °F (36.6 °C) 98.5 °F (36.9 °C)   TempSrc: Oral Oral Oral Oral   SpO2:       Weight:           Physical Exam:    General:    alert, appears stated age, and cooperative   Lochia:  appropriate   Uterine Fundus:   firm below umbilicus   Perineum:  healing well, no significant drainage, no dehiscence, no significant erythema    DVT Evaluation:  No evidence of DVT seen on physical exam.     Urinary output is adequate.     Labs:  Recent Labs   Lab 24  0540   RBC 3.79*   HGB 11.2*   HCT 32.8*   MCV 86.5   MCH 29.6   MCHC 34.1   RDW 13.7   NEPRELIM 8.17*   WBC 11.2*   .0       ASSESSMENT/PLAN:  Patient is a 33 year old  female who is s/p  PPD# 2     Doing well   OK for discharge today, instructions given  Follow up 6 wk postpartum visit    Type 2 DM  - endocrine managing       MD DIALLO Silva OBGYN

## 2024-05-22 NOTE — ANESTHESIA POSTPROCEDURE EVALUATION
Patient: Kady Matthews    Procedure Summary       Date: 05/13/24 Room / Location:     Anesthesia Start: 1800 Anesthesia Stop: 1830    Procedure: LABOR ANALGESIA Diagnosis:     Scheduled Providers:  Anesthesiologist: Franchesca Harman MD    Anesthesia Type: epidural ASA Status: 2            Anesthesia Type: epidural    Vitals Value Taken Time   BP 92/71 05/13/24 1930   Temp 98.3 °F (36.8 °C) 05/13/24 1915   Pulse 72 05/13/24 1930   Resp 16 05/13/24 1915   SpO2 100 % 05/13/24 1930       [facility] AN Post Evaluation:   Patient Evaluated in PACU  Patient Participation: complete - patient participated  Level of Consciousness: awake  Pain Management: adequate  Airway Patency:patent  Dental exam unchanged from preop  Yes    Cardiovascular Status: acceptable  Respiratory Status: acceptable  Postoperative Hydration acceptable      FRANCHESCA HARMAN MD  5/22/2024 2:52 PM

## 2024-06-06 ENCOUNTER — OFFICE VISIT (OUTPATIENT)
Dept: ENDOCRINOLOGY CLINIC | Facility: CLINIC | Age: 34
End: 2024-06-06
Payer: MEDICAID

## 2024-06-06 VITALS
WEIGHT: 180 LBS | SYSTOLIC BLOOD PRESSURE: 113 MMHG | DIASTOLIC BLOOD PRESSURE: 73 MMHG | BODY MASS INDEX: 29.99 KG/M2 | HEART RATE: 90 BPM | HEIGHT: 65 IN

## 2024-06-06 DIAGNOSIS — E11.9 CONTROLLED TYPE 2 DIABETES MELLITUS WITHOUT COMPLICATION, WITHOUT LONG-TERM CURRENT USE OF INSULIN (HCC): Primary | ICD-10-CM

## 2024-06-06 LAB
GLUCOSE BLOOD: 85
HEMOGLOBIN A1C: 5.3 % (ref 4.3–5.6)
TEST STRIP LOT #: NORMAL NUMERIC

## 2024-06-06 PROCEDURE — 99213 OFFICE O/P EST LOW 20 MIN: CPT

## 2024-06-06 PROCEDURE — 83036 HEMOGLOBIN GLYCOSYLATED A1C: CPT

## 2024-06-06 PROCEDURE — 82947 ASSAY GLUCOSE BLOOD QUANT: CPT

## 2024-06-06 RX ORDER — ACYCLOVIR 400 MG/1
1 TABLET ORAL
Qty: 3 EACH | Refills: 1 | Status: SHIPPED | OUTPATIENT
Start: 2024-06-06

## 2024-06-06 NOTE — PROGRESS NOTES
Name: Kady Matthews  Date: 6/6/24    Referring Physician: No ref. provider found    HISTORY OF PRESENT ILLNESS   Kady Matthews is a 33 year old female who presents for follow up for diabetes mellitus type 2    She has a Hx of GDM during both of her prior pregnancies. She was diet controlled during first pregnancy but did require insulin in her second. She was not on any medication following delivery of second child. Did progress to DM type 2 following second pregnancy. Was found to have HgA1c of 6.9% when seen at  3/2023 and then repeat a few weeks later was improved at 6.6%. Was maintained on metformin prior to pregnancy.     Since last visit she has delivered baby girl about 2 weeks ago.     Diabetes History:  Diagnosed- new diagnosis 3/2023, GDM in both of her previous pregnancies     FH DM  - mother - DM type 2  - sister - GDM    Prior glycohemoglobin were: 6.6% 3/2023; 6.0% 10/2023; 5.5% 11/2023; 5.3% 12/2023; 5.3% 1/2024; 5.8% 3/2024; 5.3% 4/2024; 5.3% POC today     Glucose in clinic- 83 mg/dl     Dietary compliance: Fair- not following low CHO diet at this time. Not cooking so is eating out. Does avoid sweets and sweetened beverages.      Recall:  Breakfast- cereal occ. But trying to avoid. Sometimes skips   Lunch- skips at times- doesn't bring lunch for work shift.   Dinner- stews, soups, or protein and vegetable. Sometimes steak with rice, occ. Chicken or pasta.   Snack-  the evening- ice cream or sugar free chocolate.   Beverages- coke zero, or water     Exercise: None right now.     Polyuria/polydipsia: No   Blurred vision: No    Episodes of hypoglycemia: None since discharge home.     Blood Glucose:  Checking 4 times daily  Still using Dexcom G7 although has been off since last week-CGM fell off     Reviewed logs for week following discharge:     95% of glucose readings in target range ()  5% of glucose readings above target range   <1% below target range     Trends: Overall sugars stable  since discharge. Occ. Post prandial spike in blood sugars up to 180-190's. No hypoglycemia noted.       Current DM Regimen:  None- off medication post delivery     Modifying factors:  Medication adherence: Yes   Recent steroids, illness or infections: Covid 12/2023     REVIEW OF SYSTEMS  Eyes: Diabetic retinopathy present: None known             Most recent visit to eye doctor in last 12 months: Yes, 7/2023     CV: Cardiovascular disease present: No          Hypertension present: No          Hyperlipidemia present: No          Peripheral Vascular Disease present: No     : Nephropathy present: No     Neuro: Neuropathy present: No, denies symptoms     Skin: Infection or ulceration: No     Osteoporosis: No     Thyroid disease: No       Medications:     Current Outpatient Medications:     acetaminophen 500 MG Oral Tab, Take 2 tablets (1,000 mg total) by mouth every 6 (six) hours as needed., Disp: 40 tablet, Rfl: 0    ibuprofen 600 MG Oral Tab, Take 1 tablet (600 mg total) by mouth every 6 (six) hours as needed for Fever., Disp: 40 tablet, Rfl: 0    Insulin Pen Needle (BD PEN NEEDLE MALISSA U/F) 32G X 4 MM Does not apply Misc, Inject 6 times daily, Disp: 200 each, Rfl: 2    Prenatal Multivit-Min-Fe-FA (PRENATAL OR), Take by mouth., Disp: , Rfl:     Continuous Blood Gluc Sensor (DEXCOM G7 SENSOR) Does not apply Misc, 1 each Every 10 days. Use as directed every 10 days, Disp: 3 each, Rfl: 3    Continuous Blood Gluc Sensor (DEXCOM G7 SENSOR) Does not apply Misc, 1 each Every 10 days. Use as directed every 10 days, Disp: 3 each, Rfl: 5     Allergies:   Allergies   Allergen Reactions    Melons OTHER (SEE COMMENTS)     Itchy throat       Social History:   Social History     Socioeconomic History    Marital status: Single   Occupational History    Occupation: Unemployed   Tobacco Use    Smoking status: Some Days     Current packs/day: 0.00     Types: Cigarettes     Last attempt to quit: 10/1/2020     Years since quitting: 3.6     Smokeless tobacco: Former   Vaping Use    Vaping status: Never Used   Substance and Sexual Activity    Alcohol use: Not Currently     Comment: occasional    Drug use: Not Currently     Types: Cannabis     Comment: occasionally    Sexual activity: Yes     Partners: Male   Other Topics Concern    Blood Transfusions No       Medical History:   Past Medical History:    Diabetes (HCC)    Preexisting diabetes complicating pregnancy, antepartum (HCC)    was on oral meds prior to pregnancy       Surgical history:   No past surgical history on file.      PHYSICAL EXAM  Ht 5' 5\" (1.651 m)   Wt 180 lb (81.6 kg)   LMP 08/19/2023 (Exact Date)   BMI 29.95 kg/m²     General Appearance:  alert, well developed, in no acute distress  Eyes:  normal conjunctivae, sclera, and normal pupils  Neck: Trachea midline: Normal  Back: no kyphosis or back tenderness  Lymph Nodes:  No abnormal nodes noted  Musculoskeletal:  normal muscle strength and tone  Skin:  normal moisture and skin texture  Hair & Nails:  normal scalp hair     Hematologic:  no excessive bruising  Neuro:  sensory grossly intact and motor grossly intact.  Psychiatric:  oriented to time, self, and place  Nutritional:  no abnormal weight gain or loss  Feet:Bilateral barefoot skin diabetic exam is normal, visualized feet and the appearance is normal.  Bilateral monofilament/sensation of both feet is normal.  Pulsation pedal pulse exam of both lower legs/feet is normal as well.        ASSESSMENT/PLAN:    Diabetes mellitus type 2 controlled  -HgA1c- 5.3%  POC today   -Reviewed that HgA1c not most accurate glycemic measure in the months after delivery.   -Recommend she continue to monitor with CGM for now.   -Reviewed diagnosis of diabetes  -Reviewed ABC's of diabetes   - Reviewed pathogenesis of diabetes.   - Reviewed importance of good glycemic control to prevent microvascular and  macrovascular complications including nephropathy, neuropathy, retinopathy,  and cardiovascular  disease.  -Reviewed importance of good glycemic control in pregnancy to reduce risk of maternal and fetal complications.   - Reviewed importance of SBGM- check glucose 4 times daily - fasting and 2 hours post prandially    - Reviewed target glucose goals for patient during pregnancy <95 fasting and <120 post prandially   - Reviewed importance of following low CHO diet.     -Reviewed diet at length today and reviewed importance of continued low CHO diet.     -Hold off on starting medications at this time. Glucose levels are at goal off medication.   -Continue with Dexcom CGM  - May resume Metformin if glucose levels become elevated.  -She is currently breastfeeding.     - Continue Dexcom G7.  -Work on lifestyle changes as discussed above.     -normotensive  -normal lipids 3/2023, repeat labs at next viist  -no nephropathy 3/2023- repeat labs  -UTD with optho- last visit 7/2023      RTC in 2 months  MIC Rolle  6/6/24    A total of 25 minutes was spent on obtaining history, reviewing pertinent labs, evaluating patient, providing multiple treatment options, reinforcing diet/exercise and compliance, and completing documentation.

## 2024-06-18 ENCOUNTER — TELEPHONE (OUTPATIENT)
Dept: OBGYN UNIT | Facility: HOSPITAL | Age: 34
End: 2024-06-18

## 2024-06-20 ENCOUNTER — POSTPARTUM (OUTPATIENT)
Dept: OBGYN CLINIC | Facility: CLINIC | Age: 34
End: 2024-06-20

## 2024-06-20 VITALS
DIASTOLIC BLOOD PRESSURE: 72 MMHG | SYSTOLIC BLOOD PRESSURE: 118 MMHG | BODY MASS INDEX: 29.99 KG/M2 | WEIGHT: 180 LBS | HEIGHT: 65 IN

## 2024-06-20 DIAGNOSIS — E11.9 DIABETES MELLITUS TYPE 2 WITHOUT RETINOPATHY (HCC): ICD-10-CM

## 2024-06-20 DIAGNOSIS — Z30.017 INSERTION OF IMPLANTABLE SUBDERMAL CONTRACEPTIVE: ICD-10-CM

## 2024-06-20 DIAGNOSIS — Z30.017 NEXPLANON INSERTION: ICD-10-CM

## 2024-06-20 PROBLEM — O24.319 PREEXISTING DIABETES COMPLICATING PREGNANCY, ANTEPARTUM (HCC): Chronic | Status: RESOLVED | Noted: 2023-12-26 | Resolved: 2024-06-20

## 2024-06-20 LAB
CONTROL LINE PRESENT WITH A CLEAR BACKGROUND (YES/NO): YES YES/NO
KIT LOT #: NORMAL NUMERIC
PREGNANCY TEST, URINE: NEGATIVE

## 2024-06-20 PROCEDURE — 11981 INSERTION DRUG DLVR IMPLANT: CPT | Performed by: OBSTETRICS & GYNECOLOGY

## 2024-06-20 PROCEDURE — 81025 URINE PREGNANCY TEST: CPT | Performed by: OBSTETRICS & GYNECOLOGY
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
## 2024-08-26 ENCOUNTER — OFFICE VISIT (OUTPATIENT)
Dept: ENDOCRINOLOGY CLINIC | Facility: CLINIC | Age: 34
End: 2024-08-26
Payer: MEDICAID

## 2024-08-26 ENCOUNTER — LAB ENCOUNTER (OUTPATIENT)
Dept: LAB | Age: 34
End: 2024-08-26
Payer: MEDICAID

## 2024-08-26 VITALS
BODY MASS INDEX: 29.82 KG/M2 | WEIGHT: 179 LBS | HEIGHT: 65 IN | SYSTOLIC BLOOD PRESSURE: 117 MMHG | HEART RATE: 96 BPM | DIASTOLIC BLOOD PRESSURE: 82 MMHG

## 2024-08-26 DIAGNOSIS — E11.9 CONTROLLED TYPE 2 DIABETES MELLITUS WITHOUT COMPLICATION, WITHOUT LONG-TERM CURRENT USE OF INSULIN (HCC): ICD-10-CM

## 2024-08-26 DIAGNOSIS — E11.9 CONTROLLED TYPE 2 DIABETES MELLITUS WITHOUT COMPLICATION, WITHOUT LONG-TERM CURRENT USE OF INSULIN (HCC): Primary | ICD-10-CM

## 2024-08-26 LAB
ALBUMIN SERPL-MCNC: 4.3 G/DL (ref 3.2–4.8)
ALBUMIN/GLOB SERPL: 1.6 {RATIO} (ref 1–2)
ALP LIVER SERPL-CCNC: 104 U/L
ALT SERPL-CCNC: 44 U/L
ANION GAP SERPL CALC-SCNC: 7 MMOL/L (ref 0–18)
AST SERPL-CCNC: 22 U/L (ref ?–34)
BILIRUB SERPL-MCNC: 0.6 MG/DL (ref 0.3–1.2)
BUN BLD-MCNC: 6 MG/DL (ref 9–23)
BUN/CREAT SERPL: 6.5 (ref 10–20)
CALCIUM BLD-MCNC: 9.1 MG/DL (ref 8.7–10.4)
CHLORIDE SERPL-SCNC: 111 MMOL/L (ref 98–112)
CHOLEST SERPL-MCNC: 160 MG/DL (ref ?–200)
CO2 SERPL-SCNC: 23 MMOL/L (ref 21–32)
CREAT BLD-MCNC: 0.93 MG/DL
CREAT UR-SCNC: 122.6 MG/DL
EGFRCR SERPLBLD CKD-EPI 2021: 84 ML/MIN/1.73M2 (ref 60–?)
FASTING PATIENT LIPID ANSWER: NO
FASTING STATUS PATIENT QL REPORTED: NO
GLOBULIN PLAS-MCNC: 2.7 G/DL (ref 2–3.5)
GLUCOSE BLD-MCNC: 93 MG/DL (ref 70–99)
GLUCOSE BLOOD: 117
HDLC SERPL-MCNC: 39 MG/DL (ref 40–59)
HEMOGLOBIN A1C: 5.2 % (ref 4.3–5.6)
LDLC SERPL CALC-MCNC: 109 MG/DL (ref ?–100)
MICROALBUMIN UR-MCNC: <0.3 MG/DL
NONHDLC SERPL-MCNC: 121 MG/DL (ref ?–130)
OSMOLALITY SERPL CALC.SUM OF ELEC: 289 MOSM/KG (ref 275–295)
POTASSIUM SERPL-SCNC: 4.3 MMOL/L (ref 3.5–5.1)
PROT SERPL-MCNC: 7 G/DL (ref 5.7–8.2)
SODIUM SERPL-SCNC: 141 MMOL/L (ref 136–145)
TEST STRIP LOT #: NORMAL NUMERIC
TRIGL SERPL-MCNC: 59 MG/DL (ref 30–149)
VLDLC SERPL CALC-MCNC: 10 MG/DL (ref 0–30)

## 2024-08-26 PROCEDURE — 80053 COMPREHEN METABOLIC PANEL: CPT

## 2024-08-26 PROCEDURE — 36415 COLL VENOUS BLD VENIPUNCTURE: CPT

## 2024-08-26 PROCEDURE — 82043 UR ALBUMIN QUANTITATIVE: CPT

## 2024-08-26 PROCEDURE — 99214 OFFICE O/P EST MOD 30 MIN: CPT

## 2024-08-26 PROCEDURE — 83036 HEMOGLOBIN GLYCOSYLATED A1C: CPT

## 2024-08-26 PROCEDURE — 82570 ASSAY OF URINE CREATININE: CPT

## 2024-08-26 PROCEDURE — 80061 LIPID PANEL: CPT

## 2024-08-26 PROCEDURE — 82947 ASSAY GLUCOSE BLOOD QUANT: CPT

## 2024-08-26 NOTE — PROGRESS NOTES
Name: Kady Matthews  Date: 8/26/24    Referring Physician: No ref. provider found    HISTORY OF PRESENT ILLNESS   Kady Matthews is a 33 year old female who presents for follow up for diabetes mellitus type 2    She has a Hx of GDM during both of her prior pregnancies. She was diet controlled during first pregnancy but did require insulin in her second. She was not on any medication following delivery of second child. Did progress to DM type 2 following second pregnancy. Was found to have HgA1c of 6.9% when seen at  3/2023 and then repeat a few weeks later was improved at 6.6%. Was maintained on metformin prior to pregnancy.     Since last visit she has delivered baby girl who is about  3 months old.    Diabetes History:  Diagnosed- new diagnosis 3/2023, GDM in both of her previous pregnancies     FH DM  - mother - DM type 2  - sister - GDM    Prior glycohemoglobin were: 6.6% 3/2023; 6.0% 10/2023; 5.5% 11/2023; 5.3% 12/2023; 5.3% 1/2024; 5.8% 3/2024; 5.3% 4/2024; 5.3% 6/2024; 5.2% POC today     Glucose in clinic- 117 mg/dl     Dietary compliance: Good- improved diet in the last few months - more vegetables, avoiding fast food.      Recall:  Breakfast- cereal occ. But trying to avoid. Sometimes skips   Lunch- skips at times- doesn't bring lunch for work shift.   Dinner- stews, soups, or protein and vegetable. Sometimes steak with rice, occ. Chicken or pasta.   Snack-  the evening- ice cream or sugar free chocolate.   Beverages- coke zero, or water     Exercise: None right now.     Polyuria/polydipsia: No   Blurred vision: No    Episodes of hypoglycemia: None since discharge home.     Blood Glucose:   Not checking recently     Current DM Regimen:  None- off medication post delivery     Modifying factors:  Medication adherence: Yes   Recent steroids, illness or infections: No    REVIEW OF SYSTEMS  Eyes: Diabetic retinopathy present: None known             Most recent visit to eye doctor in last 12 months: Yes,  7/2023     CV: Cardiovascular disease present: No          Hypertension present: No          Hyperlipidemia present: No          Peripheral Vascular Disease present: No     : Nephropathy present: No     Neuro: Neuropathy present: No, denies symptoms     Skin: Infection or ulceration: No     Osteoporosis: No     Thyroid disease: No       Medications:     Current Outpatient Medications:     Continuous Glucose Sensor (DEXCOM G7 SENSOR) Does not apply Misc, 1 each Every 10 days. Use as directed every 10 days (Patient not taking: Reported on 6/20/2024), Disp: 3 each, Rfl: 1    acetaminophen 500 MG Oral Tab, Take 2 tablets (1,000 mg total) by mouth every 6 (six) hours as needed. (Patient not taking: Reported on 6/20/2024), Disp: 40 tablet, Rfl: 0    ibuprofen 600 MG Oral Tab, Take 1 tablet (600 mg total) by mouth every 6 (six) hours as needed for Fever. (Patient not taking: Reported on 6/20/2024), Disp: 40 tablet, Rfl: 0    Insulin Pen Needle (BD PEN NEEDLE MALISSA U/F) 32G X 4 MM Does not apply Misc, Inject 6 times daily (Patient not taking: Reported on 6/20/2024), Disp: 200 each, Rfl: 2    Prenatal Multivit-Min-Fe-FA (PRENATAL OR), Take by mouth. (Patient not taking: Reported on 6/20/2024), Disp: , Rfl:     Continuous Blood Gluc Sensor (DEXCOM G7 SENSOR) Does not apply Misc, 1 each Every 10 days. Use as directed every 10 days (Patient not taking: Reported on 6/20/2024), Disp: 3 each, Rfl: 3    Continuous Blood Gluc Sensor (DEXCOM G7 SENSOR) Does not apply Misc, 1 each Every 10 days. Use as directed every 10 days (Patient not taking: Reported on 6/20/2024), Disp: 3 each, Rfl: 5     Allergies:   Allergies   Allergen Reactions    Melons OTHER (SEE COMMENTS)     Itchy throat       Social History:   Social History     Socioeconomic History    Marital status: Single   Occupational History    Occupation: Unemployed   Tobacco Use    Smoking status: Some Days     Current packs/day: 0.00     Types: Cigarettes     Last attempt to  quit: 10/1/2020     Years since quitting: 3.9    Smokeless tobacco: Former   Vaping Use    Vaping status: Never Used   Substance and Sexual Activity    Alcohol use: Not Currently     Comment: occasional    Drug use: Not Currently     Types: Cannabis     Comment: occasionally    Sexual activity: Yes     Partners: Male   Other Topics Concern    Blood Transfusions No       Medical History:   Past Medical History:    Diabetes (HCC)    Preexisting diabetes complicating pregnancy, antepartum (HCC)    was on oral meds prior to pregnancy       Surgical history:   No past surgical history on file.      PHYSICAL EXAM  Ht 5' 5\" (1.651 m)   Wt 179 lb (81.2 kg)   LMP 08/19/2023 (Exact Date)   Breastfeeding Yes   BMI 29.79 kg/m²     General Appearance:  alert, well developed, in no acute distress  Eyes:  normal conjunctivae, sclera, and normal pupils  Neck: Trachea midline: Normal  Back: no kyphosis or back tenderness  Lymph Nodes:  No abnormal nodes noted  Musculoskeletal:  normal muscle strength and tone  Skin:  normal moisture and skin texture  Hair & Nails:  normal scalp hair     Hematologic:  no excessive bruising  Neuro:  sensory grossly intact and motor grossly intact.  Psychiatric:  oriented to time, self, and place  Nutritional:  no abnormal weight gain or loss  Feet:Bilateral barefoot skin diabetic exam is normal, visualized feet and the appearance is normal.  Bilateral monofilament/sensation of both feet is normal.  Pulsation pedal pulse exam of both lower legs/feet is normal as well.      ASSESSMENT/PLAN:    Diabetes mellitus type 2 controlled  -HgA1c- 5.2%  POC today   -Reviewed diagnosis of diabetes  -Reviewed ABC's of diabetes   - Reviewed pathogenesis of diabetes.   - Reviewed importance of good glycemic control to prevent microvascular and  macrovascular complications including nephropathy, neuropathy, retinopathy,  and cardiovascular disease.  -Reviewed importance of good glycemic control in pregnancy to  reduce risk of maternal and fetal complications.   - Reviewed importance of SBGM- check glucose 4 times daily - fasting and 2 hours post prandially    - Reviewed target glucose goals for patient during pregnancy <95 fasting and <120 post prandially   - Reviewed importance of following low CHO diet.     -Reviewed diet at length today and reviewed importance of continued low CHO diet.     -Hold off on starting medications at this time. Glucose levels are at goal off medication.   - May resume Metformin if glucose levels become elevated.  -She is currently breastfeeding.     -Work on lifestyle changes as discussed above.     -normotensive  -normal lipids 3/2023, repeat labs today   -no nephropathy 3/2023- repeat labs today   -UTD with optho- last visit 7/2023    RTC in 6 months  Aria Peña, MIC  8/26/24    A total of 30 minutes was spent on obtaining history, reviewing pertinent labs, evaluating patient, providing multiple treatment options, reinforcing diet/exercise and compliance, and completing documentation.

## 2025-02-13 ENCOUNTER — WALK IN (OUTPATIENT)
Dept: URGENT CARE | Age: 35
End: 2025-02-13

## 2025-02-13 VITALS
DIASTOLIC BLOOD PRESSURE: 66 MMHG | OXYGEN SATURATION: 100 % | TEMPERATURE: 97.2 F | RESPIRATION RATE: 18 BRPM | HEART RATE: 57 BPM | BODY MASS INDEX: 29.57 KG/M2 | SYSTOLIC BLOOD PRESSURE: 99 MMHG | HEIGHT: 66 IN | WEIGHT: 184 LBS

## 2025-02-13 DIAGNOSIS — U07.1 COVID-19 VIRUS INFECTION: ICD-10-CM

## 2025-02-13 DIAGNOSIS — J02.9 ST (SORE THROAT): Primary | ICD-10-CM

## 2025-02-13 LAB
FLUAV AG UPPER RESP QL IA.RAPID: NEGATIVE
FLUBV AG UPPER RESP QL IA.RAPID: NEGATIVE
INTERNAL PROCEDURAL CONTROLS ACCEPTABLE: YES
INTERNAL PROCEDURAL CONTROLS ACCEPTABLE: YES
SARS-COV+SARS-COV-2 AG RESP QL IA.RAPID: DETECTED
TEST LOT EXPIRATION DATE: ABNORMAL
TEST LOT EXPIRATION DATE: NORMAL
TEST LOT NUMBER: ABNORMAL
TEST LOT NUMBER: NORMAL

## 2025-04-23 ENCOUNTER — OFFICE VISIT (OUTPATIENT)
Dept: FAMILY MEDICINE CLINIC | Facility: CLINIC | Age: 35
End: 2025-04-23

## 2025-04-23 ENCOUNTER — NURSE ONLY (OUTPATIENT)
Dept: INTERNAL MEDICINE CLINIC | Facility: CLINIC | Age: 35
End: 2025-04-23

## 2025-04-23 VITALS
TEMPERATURE: 98 F | BODY MASS INDEX: 30.82 KG/M2 | RESPIRATION RATE: 18 BRPM | WEIGHT: 185 LBS | DIASTOLIC BLOOD PRESSURE: 74 MMHG | HEIGHT: 65 IN | HEART RATE: 102 BPM | OXYGEN SATURATION: 99 % | SYSTOLIC BLOOD PRESSURE: 105 MMHG

## 2025-04-23 DIAGNOSIS — Z00.00 ROUTINE HEALTH MAINTENANCE: Primary | ICD-10-CM

## 2025-04-23 DIAGNOSIS — E11.9 TYPE 2 DIABETES MELLITUS WITHOUT COMPLICATION, WITHOUT LONG-TERM CURRENT USE OF INSULIN (HCC): ICD-10-CM

## 2025-04-23 LAB
CREAT UR-SCNC: 135.7 MG/DL
HEMOGLOBIN A1C: 6.2 % (ref 4.3–5.6)
MICROALBUMIN UR-MCNC: <0.3 MG/DL

## 2025-04-23 PROCEDURE — 90472 IMMUNIZATION ADMIN EACH ADD: CPT | Performed by: FAMILY MEDICINE

## 2025-04-23 PROCEDURE — 90746 HEPB VACCINE 3 DOSE ADULT IM: CPT | Performed by: FAMILY MEDICINE

## 2025-04-23 PROCEDURE — 92229 IMG RTA DETC/MNTR DS POC ALY: CPT | Performed by: FAMILY MEDICINE

## 2025-04-23 PROCEDURE — 99395 PREV VISIT EST AGE 18-39: CPT | Performed by: FAMILY MEDICINE

## 2025-04-23 PROCEDURE — 90471 IMMUNIZATION ADMIN: CPT | Performed by: FAMILY MEDICINE

## 2025-04-23 PROCEDURE — 90632 HEPA VACCINE ADULT IM: CPT | Performed by: FAMILY MEDICINE

## 2025-04-23 PROCEDURE — 83036 HEMOGLOBIN GLYCOSYLATED A1C: CPT | Performed by: FAMILY MEDICINE
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
## (undated) NOTE — LETTER
Dear New Mom,    We hope you are doing well. If, for any reason, you have questions or concerns about your health or your baby’s health, please contact your provider or your pediatrician or family medicine physician regarding your baby.     At [facility], we feel that postpartum support is very important for new families. Please see the enclosed new parent support flyer that lists support programs and resources with both in-person and online options.     Additionally, our Breastfeeding Centers at [facility] and [facility] in [place], offer outpatient visits with our International Board-Certified Lactation   Consultants (IBCLCs) for any breastfeeding concerns or questions you may have.    For issues related to stress, anxiety or depression, we have a Nurturing Mom support group that meets both in-person or online.  There’s also a 24-hour Mom’s Line where you can request a phone call from a clinical therapist for assistance for postpartum depression.    We encourage you to take advantage of these programs and resources as you recover from childbirth and learn to care for your new infant.    Best wishes,    [facility]            [id number]

## (undated) NOTE — LETTER
Kady Matthews, :1990    CONSENTIMIENTO PARA EL PROCEDIMIENTO/SEDACIÓN    1. Autorizo que se realice a Kady Matthews lo siguiente:Nexplanon Insertion    2. Autorizo al Dr. Alina Scott MD (y a quien esté designado benny asistente del médico), a llevar a cabo los procedimientos antes mencionados.    3. Si surgen situaciones imprevistas frannie beto procedimiento que requieran procedimientos, operaciones o medicamentos adicionales (incluyendo anestesia y transfusión de luan), también solicito y autorizo al médico que dixie lo que él/mildred considere conveniente en mi mejor interés.    4. Doy mi consentimiento para svetlana y reproducir fotografías en el transcurso de beto procedimiento con fines profesionales.    5. Doy mi consentimiento para la administración de la sedación que se considere necesaria o conveniente por el médico responsable de beto servicio, con la excepción de      6. He sido informado por mi médico sobre la naturaleza y el propósito de beto   procedimiento, sedación, los posibles métodos alternativos de tratamiento, los riesgos y las posibles complicaciones.        Firma del paciente:      Firma de la persona autorizada para basilio el consentimiento por el paciente: ______________________________________________________________________    Relación con el paciente:      Testigo:  Fecha:         Firma Médico: ___________________________________________________________

## (undated) NOTE — LETTER
2023      No Recipients     Patient: Rajeev Mccoy   YOB: 1990   Date of Visit: 2023       Dear Dr. Yoandy Serrano Recipients: Thank you for referring Rajeev Mccoy to me for evaluation. Here is my assessment and plan of care:    Rajeev Mccoy is a 29year old female. HPI:     HPI    New patient here for a diabetic exam per Dr. Chente Healy. Patient complains of blurry vision at distance. She wears glasses but forgot to bring them with her today. Pt has been a diabetic for 2 months (pt had gestational diabetes with 2 pregnancies)       Pt's diabetes is currently controlled by pills  Pt does not check her BS   Pt's last blood sugar was  174 on 3/22/23  Last HA1C was 6.6 on 3/22/23  Endocrinologist: Mark HAILE  Last edited by Nicho Steve OT on 2023  4:24 PM.        Patient History:  Past Medical History:   Diagnosis Date    Gestational diabetes        Surgical History: Rajeev Mccoy has no past surgical history on file. Family History   Problem Relation Age of Onset    Diabetes Mother     Glaucoma Neg     Macular degeneration Neg        Social History:   Social History     Socioeconomic History    Marital status: Single   Tobacco Use    Smoking status: Former     Types: Cigarettes     Quit date: 10/1/2020     Years since quittin.8    Smokeless tobacco: Former   Vaping Use    Vaping Use: Never used   Substance and Sexual Activity    Alcohol use: Yes     Comment: occasional    Drug use: Yes     Types: Cannabis     Comment: occasionally       Medications:  Current Outpatient Medications   Medication Sig Dispense Refill    atorvastatin 10 MG Oral Tab Take 1 tablet (10 mg total) by mouth nightly. 90 tablet 3    metFORMIN  MG Oral Tablet 24 Hr Take 1 tablet (750 mg total) by mouth daily. 90 tablet 3    Glucose Blood In Vitro Strip Test blood sugar 4 times daily as directed. Diagnosis: GDM.  Meter: Accu-chek Guide (Patient not taking: Reported on 7/25/2023)         Allergies:    Melons                  OTHER (SEE COMMENTS)    Comment:Itchy throat    ROS:     ROS    Positive for: Endocrine, Eyes  Negative for: Constitutional, Gastrointestinal, Neurological, Skin, Genitourinary, Musculoskeletal, HENT, Cardiovascular, Respiratory, Psychiatric, Allergic/Imm, Heme/Lymph  Last edited by Majo Louis OT on 7/25/2023  4:08 PM.          PHYSICAL EXAM:     Base Eye Exam       Visual Acuity (Snellen - Linear)         Right Left    Dist sc 20/20 20/20    Near sc 20/20 20/20              Tonometry ([facility]care, 4:16 PM)         Right Left    Pressure 20 20              Pupils         Pupils    Right PERRL    Left PERRL              Visual Fields         Left Right     Full Full              Extraocular Movement         Right Left     Full, Ortho Full, Ortho              Neuro/Psych       Oriented x3:  Yes              Dilation       Both eyes: 1.0% Mydriacyl and 2.5% Kenroy Synephrine @ 4:17 PM              Dilation #2       Both eyes: 1.0% Mydriacyl and 2.5% Kenroy Synephrine @ 4:17 PM                  Slit Lamp and Fundus Exam       Slit Lamp Exam         Right Left    Lids/Lashes Meibomian gland dysfunction Meibomian gland dysfunction    Conjunctiva/Sclera Normal Normal    Cornea Clear Clear    Anterior Chamber Deep and quiet Deep and quiet    Iris Normal Normal    Lens Clear Clear    Vitreous Clear Clear              Fundus Exam         Right Left    Disc Good rim Good rim    C/D Ratio 0.3 0.3    Macula Normal, no BDR Normal, no BDR    Vessels Normal Normal    Periphery Normal Normal                  Refraction       Wearing Rx       Type: Forgot glasses              Cycloplegic Refraction (Auto)         Sphere Cylinder Axis Dist VA    Right -0.25 +0.50 135     Left -0.75 +0.75 065               Cycloplegic Refraction #2         Sphere Cylinder Axis Dist VA    Right -0.25 +0.50 135 20/20    Left -0.75 +0.75 065 20/20              Final Rx         Sphere Cylinder [place] Dist VA Right -0.25 +0.50 135 20/20    Left -0.75 +0.75 065 20/20      Type: Single vision                     ASSESSMENT/PLAN:     Diagnoses and Plan:     Diabetes mellitus type 2 without retinopathy ([address]  Diabetes type II: no background of retinopathy, no signs of neovascularization noted. Discussed ocular and systemic benefits of blood sugar control. Diagnosis and treatment discussed in detail with patient. Myopia of both eyes with astigmatism  Mild glasses Rx given. Update as needed. No orders of the defined types were placed in this encounter. Meds This Visit:  Requested Prescriptions      No prescriptions requested or ordered in this encounter        Follow up instructions:  Return in about 1 year (around 7/25/2024) for DM Eye Exam.    7/25/2023  Scribed by: Katie Puente MD        If you have questions, please do not hesitate to call me. I look forward to following Elizabeth Ferraro along with you.     Sincerely,        Katie Puente MD        CC:   No Recipients    Document electronically generated by: Katie Puente MD

## (undated) NOTE — LETTER
[facility]  Administration of Anesthesia  I, Kady Matthews agree to be cared for by a physician anesthesiologist alone and/or with a nurse anesthetist, who is specially trained to monitor me and give me medicine to put me to sleep or keep me comfortable during my procedure    I understand that my anesthesiologist and/or anesthetist is not an employee or agent of [facility] or [employer]. He or she works for [facility]    As the patient asking for anesthesia services, I agree to:  Allow the anesthesiologist (anesthesia doctor) to give me medicine and do additional procedures as necessary. Some examples are: Starting or using an “IV” to give me medicine, fluids or blood during my procedure, and having a breathing tube placed to help me breathe when I’m asleep (intubation). In the event that my heart stops working properly, I understand that my anesthesiologist will make every effort to sustain my life, unless otherwise directed by [facility] Do Not Resuscitate documents.  Tell my anesthesia doctor before my procedure:  If I am pregnant.  The last time that I ate or drank.  iii. All of the medicines I take (including prescriptions, herbal supplements, and pills I can buy without a prescription (including street drugs/illegal medications). Failure to inform my anesthesiologist about these medicines may increase my risk of anesthetic complications.  iv.If I am allergic to anything or have had a reaction to anesthesia before.  I understand how the anesthesia medicine will help me (benefits).  I understand that with any type of anesthesia medicine there are risks:  The most common risks are: nausea, vomiting, sore throat, muscle soreness, damage to my eyes, mouth, or teeth (from breathing tube placement).  Rare risks include: remembering what happened during my procedure, allergic reactions to medications, injury to my airway, heart, lungs, vision, nerves, or  muscles and in extremely rare instances death.  My doctor has explained to me other choices available to me for my care (alternatives).  Pregnant Patients (“epidural”):  I understand that the risks of having an epidural (medicine given into my back to help control pain during labor), include itching, low blood pressure, difficulty urinating, headache or slowing of the baby’s heart. Very rare risks include infection, bleeding, seizure, irregular heart rhythms and nerve injury.  Regional Anesthesia (“spinal”, “epidural”, & “nerve blocks”):  I understand that rare but potential complications include headache, bleeding, infection, seizure, irregular heart rhythms, and nerve injury.    _____________________________________________________________________________  Patient (or Representative) Signature/Relationship to Patient  Date   Time    _____________________________________________________________________________   Name (if used)    Language/Organization   Time    _____________________________________________________________________________  Nurse Anesthetist Signature     Date   Time  _____________________________________________________________________________  Anesthesiologist Signature     Date   Time  I have discussed the procedure and information above with the patient (or patient’s representative) and answered their questions. The patient or their representative has agreed to have anesthesia services.    _____________________________________________________________________________  Witness        Date   Time  I have verified that the signature is that of the patient or patient’s representative, and that it was signed before the procedure  Patient Name: Kady Matthews     : 1990                 Printed: 2024 at 8:21 AM    Medical Record #: [id number]                                            Page 1 of 1  ----------ANESTHESIA CONSENT----------

## (undated) NOTE — LETTER
Kady Matthews, :1990    CONSENT FOR PROCEDURE/SEDATION    1. I authorize the performance upon Kady Matthews  the following: Nexplanon Insertion    2. I authorize Dr. Alina Scott MD (and whomever is designated as the doctor’s assistant), to perform the above-mentioned procedures.    3. If any unforeseen conditions arise during this procedure calling for additional  procedures, operations, or medications (including anesthesia and blood transfusion), I further request and authorize the doctor to do whatever he/she deems advisable in my interest.    4. I consent to the taking and reproduction of any photographs in the course of this procedure for professional purposes.    5. I consent to the administration of such sedation as may be considered necessary or advisable by the physician responsible for this service, with the exception of ______________________________________________________    6. I have been informed by my doctor of the nature and purpose of this procedure sedation, possible alternative methods of treatment, risk involved and possible complications.      Signature of Patient:_______________________________________________    Signature of person authorized to consent for patient:  _______________________________________________________________    Relationship to patient: ____________________________________________    Witness: _________________________________________ Date:___________     Physician Signature: _______________________________ Date:___________